# Patient Record
Sex: FEMALE | Race: WHITE | NOT HISPANIC OR LATINO | Employment: FULL TIME | URBAN - METROPOLITAN AREA
[De-identification: names, ages, dates, MRNs, and addresses within clinical notes are randomized per-mention and may not be internally consistent; named-entity substitution may affect disease eponyms.]

---

## 2017-02-01 ENCOUNTER — COMMUNICATION - HEALTHEAST (OUTPATIENT)
Dept: SCHEDULING | Facility: CLINIC | Age: 22
End: 2017-02-01

## 2017-02-03 ENCOUNTER — OFFICE VISIT - HEALTHEAST (OUTPATIENT)
Dept: FAMILY MEDICINE | Facility: CLINIC | Age: 22
End: 2017-02-03

## 2017-02-03 DIAGNOSIS — S13.9XXA NECK SPRAIN: ICD-10-CM

## 2017-02-03 ASSESSMENT — MIFFLIN-ST. JEOR: SCORE: 1154.78

## 2017-02-08 ENCOUNTER — OFFICE VISIT - HEALTHEAST (OUTPATIENT)
Dept: FAMILY MEDICINE | Facility: CLINIC | Age: 22
End: 2017-02-08

## 2017-02-08 DIAGNOSIS — S16.1XXA CERVICAL STRAIN: ICD-10-CM

## 2017-02-08 ASSESSMENT — MIFFLIN-ST. JEOR: SCORE: 1152.51

## 2018-06-06 ENCOUNTER — PRENATAL OFFICE VISIT - HEALTHEAST (OUTPATIENT)
Dept: FAMILY MEDICINE | Facility: CLINIC | Age: 23
End: 2018-06-06

## 2018-06-06 DIAGNOSIS — Z34.90 PREGNANCY: ICD-10-CM

## 2018-06-06 DIAGNOSIS — N91.2 AMENORRHEA: ICD-10-CM

## 2018-06-06 LAB — HCG UR QL: POSITIVE

## 2018-06-06 ASSESSMENT — MIFFLIN-ST. JEOR: SCORE: 1157.15

## 2018-06-11 ENCOUNTER — COMMUNICATION - HEALTHEAST (OUTPATIENT)
Dept: FAMILY MEDICINE | Facility: CLINIC | Age: 23
End: 2018-06-11

## 2018-06-14 ENCOUNTER — COMMUNICATION - HEALTHEAST (OUTPATIENT)
Dept: FAMILY MEDICINE | Facility: CLINIC | Age: 23
End: 2018-06-14

## 2018-06-18 ENCOUNTER — RECORDS - HEALTHEAST (OUTPATIENT)
Dept: ADMINISTRATIVE | Facility: OTHER | Age: 23
End: 2018-06-18

## 2018-06-19 ENCOUNTER — RECORDS - HEALTHEAST (OUTPATIENT)
Dept: ADMINISTRATIVE | Facility: OTHER | Age: 23
End: 2018-06-19

## 2018-06-19 ENCOUNTER — COMMUNICATION - HEALTHEAST (OUTPATIENT)
Dept: FAMILY MEDICINE | Facility: CLINIC | Age: 23
End: 2018-06-19

## 2018-06-21 ENCOUNTER — COMMUNICATION - HEALTHEAST (OUTPATIENT)
Dept: SCHEDULING | Facility: CLINIC | Age: 23
End: 2018-06-21

## 2018-06-25 ENCOUNTER — OFFICE VISIT - HEALTHEAST (OUTPATIENT)
Dept: FAMILY MEDICINE | Facility: CLINIC | Age: 23
End: 2018-06-25

## 2018-06-25 DIAGNOSIS — O03.9 MISCARRIAGE: ICD-10-CM

## 2018-06-25 LAB — HCG SERPL-ACNC: 283 MLU/ML (ref 0–4)

## 2018-06-25 ASSESSMENT — MIFFLIN-ST. JEOR: SCORE: 1135.89

## 2018-06-28 ENCOUNTER — RECORDS - HEALTHEAST (OUTPATIENT)
Dept: ADMINISTRATIVE | Facility: OTHER | Age: 23
End: 2018-06-28

## 2019-02-26 ENCOUNTER — COMMUNICATION - HEALTHEAST (OUTPATIENT)
Dept: FAMILY MEDICINE | Facility: CLINIC | Age: 24
End: 2019-02-26

## 2019-03-09 ENCOUNTER — COMMUNICATION - HEALTHEAST (OUTPATIENT)
Dept: FAMILY MEDICINE | Facility: CLINIC | Age: 24
End: 2019-03-09

## 2019-03-13 ENCOUNTER — OFFICE VISIT - HEALTHEAST (OUTPATIENT)
Dept: FAMILY MEDICINE | Facility: CLINIC | Age: 24
End: 2019-03-13

## 2019-03-13 DIAGNOSIS — N91.2 AMENORRHEA: ICD-10-CM

## 2019-03-13 DIAGNOSIS — Z87.59 HISTORY OF MISCARRIAGE: ICD-10-CM

## 2019-03-13 DIAGNOSIS — Z34.90 PREGNANCY, UNSPECIFIED GESTATIONAL AGE: ICD-10-CM

## 2019-03-13 LAB — HCG UR QL: POSITIVE

## 2019-03-13 ASSESSMENT — MIFFLIN-ST. JEOR: SCORE: 1167.64

## 2019-03-15 ENCOUNTER — COMMUNICATION - HEALTHEAST (OUTPATIENT)
Dept: FAMILY MEDICINE | Facility: CLINIC | Age: 24
End: 2019-03-15

## 2019-04-02 ENCOUNTER — HOSPITAL ENCOUNTER (OUTPATIENT)
Dept: ULTRASOUND IMAGING | Facility: CLINIC | Age: 24
Discharge: HOME | End: 2019-04-02
Attending: FAMILY MEDICINE

## 2019-04-02 DIAGNOSIS — Z34.90 PREGNANCY, UNSPECIFIED GESTATIONAL AGE: ICD-10-CM

## 2019-04-03 ENCOUNTER — COMMUNICATION - HEALTHEAST (OUTPATIENT)
Dept: FAMILY MEDICINE | Facility: CLINIC | Age: 24
End: 2019-04-03

## 2019-04-18 ENCOUNTER — PRENATAL OFFICE VISIT - HEALTHEAST (OUTPATIENT)
Dept: FAMILY MEDICINE | Facility: CLINIC | Age: 24
End: 2019-04-18

## 2019-04-18 DIAGNOSIS — Z34.90 PREGNANCY, UNSPECIFIED GESTATIONAL AGE: ICD-10-CM

## 2019-04-18 LAB
ALBUMIN UR-MCNC: NEGATIVE MG/DL
AMPHETAMINES UR QL SCN: NORMAL
APPEARANCE UR: CLEAR
BARBITURATES UR QL: NORMAL
BASOPHILS # BLD AUTO: 0 THOU/UL (ref 0–0.2)
BASOPHILS NFR BLD AUTO: 1 % (ref 0–2)
BENZODIAZ UR QL: NORMAL
BILIRUB UR QL STRIP: NEGATIVE
CANNABINOIDS UR QL SCN: NORMAL
COCAINE UR QL: NORMAL
COLOR UR AUTO: YELLOW
CREAT UR-MCNC: 71.7 MG/DL
EOSINOPHIL # BLD AUTO: 0.1 THOU/UL (ref 0–0.4)
EOSINOPHIL NFR BLD AUTO: 1 % (ref 0–6)
ERYTHROCYTE [DISTWIDTH] IN BLOOD BY AUTOMATED COUNT: 13.5 % (ref 11–14.5)
GLUCOSE UR STRIP-MCNC: NEGATIVE MG/DL
HCT VFR BLD AUTO: 33.7 % (ref 35–47)
HGB BLD-MCNC: 11.2 G/DL (ref 12–16)
HGB UR QL STRIP: NEGATIVE
HIV 1+2 AB+HIV1 P24 AG SERPL QL IA: NEGATIVE
KETONES UR STRIP-MCNC: NEGATIVE MG/DL
LEUKOCYTE ESTERASE UR QL STRIP: NEGATIVE
LYMPHOCYTES # BLD AUTO: 1.5 THOU/UL (ref 0.8–4.4)
LYMPHOCYTES NFR BLD AUTO: 20 % (ref 20–40)
MCH RBC QN AUTO: 28.4 PG (ref 27–34)
MCHC RBC AUTO-ENTMCNC: 33.2 G/DL (ref 32–36)
MCV RBC AUTO: 85 FL (ref 80–100)
MONOCYTES # BLD AUTO: 0.7 THOU/UL (ref 0–0.9)
MONOCYTES NFR BLD AUTO: 9 % (ref 2–10)
NEUTROPHILS # BLD AUTO: 5.3 THOU/UL (ref 2–7.7)
NEUTROPHILS NFR BLD AUTO: 70 % (ref 50–70)
NITRATE UR QL: NEGATIVE
OPIATES UR QL SCN: NORMAL
OXYCODONE UR QL: NORMAL
PCP UR QL SCN: NORMAL
PH UR STRIP: 7 [PH] (ref 5–8)
PLATELET # BLD AUTO: 263 THOU/UL (ref 140–440)
PMV BLD AUTO: 10.5 FL (ref 8.5–12.5)
RBC # BLD AUTO: 3.95 MILL/UL (ref 3.8–5.4)
SP GR UR STRIP: 1.02 (ref 1–1.03)
UROBILINOGEN UR STRIP-ACNC: NORMAL
WBC: 7.7 THOU/UL (ref 4–11)

## 2019-04-18 ASSESSMENT — MIFFLIN-ST. JEOR: SCORE: 1180.3

## 2019-04-19 LAB
ABO/RH(D): NORMAL
ABORH REPEAT: NORMAL
ANTIBODY SCREEN: NEGATIVE
BACTERIA SPEC CULT: NO GROWTH
C TRACH DNA SPEC QL PROBE+SIG AMP: NEGATIVE
COLLECTION METHOD: NORMAL
HBV SURFACE AG SERPL QL IA: NEGATIVE
LEAD BLD-MCNC: NORMAL UG/DL
LEAD RETEST: NO
N GONORRHOEA DNA SPEC QL NAA+PROBE: NEGATIVE
RUBV IGG SERPL QL IA: POSITIVE
T PALLIDUM AB SER QL: NEGATIVE

## 2019-04-20 LAB — LEAD BLDV-MCNC: <2 UG/DL (ref 0–4.9)

## 2019-04-22 ENCOUNTER — COMMUNICATION - HEALTHEAST (OUTPATIENT)
Dept: FAMILY MEDICINE | Facility: CLINIC | Age: 24
End: 2019-04-22

## 2019-04-22 LAB
BKR LAB AP ABNORMAL BLEEDING: NO
BKR LAB AP BIRTH CONTROL/HORMONES: NORMAL
BKR LAB AP CERVICAL APPEARANCE: NORMAL
BKR LAB AP GYN ADEQUACY: NORMAL
BKR LAB AP GYN INTERPRETATION: NORMAL
BKR LAB AP HPV REFLEX: NORMAL
BKR LAB AP LMP: NORMAL
BKR LAB AP PATIENT STATUS: NORMAL
BKR LAB AP PREVIOUS ABNORMAL: NO
BKR LAB AP PREVIOUS NORMAL: NORMAL
HIGH RISK?: NO
PATH REPORT.COMMENTS IMP SPEC: NORMAL
RESULT FLAG (HE HISTORICAL CONVERSION): NORMAL

## 2019-04-30 ENCOUNTER — COMMUNICATION - HEALTHEAST (OUTPATIENT)
Dept: FAMILY MEDICINE | Facility: CLINIC | Age: 24
End: 2019-04-30

## 2019-05-07 ENCOUNTER — COMMUNICATION - HEALTHEAST (OUTPATIENT)
Dept: FAMILY MEDICINE | Facility: CLINIC | Age: 24
End: 2019-05-07

## 2019-05-08 ENCOUNTER — AMBULATORY - HEALTHEAST (OUTPATIENT)
Dept: FAMILY MEDICINE | Facility: CLINIC | Age: 24
End: 2019-05-08

## 2019-05-08 DIAGNOSIS — Z34.90 PREGNANCY, UNSPECIFIED GESTATIONAL AGE: ICD-10-CM

## 2019-05-08 RX ORDER — PRENATAL VIT/IRON FUM/FOLIC AC 27MG-0.8MG
1 TABLET ORAL DAILY
Qty: 100 TABLET | Refills: 3 | Status: SHIPPED | OUTPATIENT
Start: 2019-05-08

## 2019-05-17 ENCOUNTER — PRENATAL OFFICE VISIT - HEALTHEAST (OUTPATIENT)
Dept: FAMILY MEDICINE | Facility: CLINIC | Age: 24
End: 2019-05-17

## 2019-05-17 DIAGNOSIS — Z34.90 PREGNANCY, UNSPECIFIED GESTATIONAL AGE: ICD-10-CM

## 2019-05-17 ASSESSMENT — MIFFLIN-ST. JEOR: SCORE: 1198.44

## 2019-05-20 ENCOUNTER — COMMUNICATION - HEALTHEAST (OUTPATIENT)
Dept: FAMILY MEDICINE | Facility: CLINIC | Age: 24
End: 2019-05-20

## 2019-05-20 LAB
AFP MOM: 0.86 MOM
ALPHA FETO PROTEIN: 38.5 NG/ML
CALCULATED AGE AT EDD: NORMAL
COLLECTION DATE: NORMAL
CURRENT CIGARETTE SMOKING STATUS: NORMAL
DOWN SYNDROME MATERNAL AGE RISK: NORMAL
DOWN SYNDROME SCREEN RISK ESTIMATE: NORMAL
EDD BY U/S SCAN: NORMAL
GA ON COLLECTION BY U/S SCAN: NORMAL WK,D
GA USED IN RISK ESTIMATE: NORMAL
GENERAL TEST INFORMATION: NORMAL
HCG, TOTAL MOM: 2.22 MOM
HCG, TOTAL: 65.3 IU/ML
INHIBIN MOM: 1.32 MOM
INHIBIN: 236 PG/ML
INITIAL OR REPEAT TESTING: NORMAL
INSULIN DIABETIC: NO
INTERPRETATION: NORMAL
IVF PREGNANCY: NO
Lab: NORMAL
NEURAL TUBE DEFECT RISK ESTIMATE: NORMAL
NUMBER OF CHORIONS: NORMAL
NUMBER OF FETUSES:: 1
PATIENT OR FATHER OF BABY HAS A NTD: NO
PATIENT WEIGHT: 114 LBS
PHYSICIAN PHONE NUMBER: NORMAL
PREV DOWN (T21) / TRISOMY PREGNANCY: NO
PREV PREGNANCY W/ NEURAL TUBE DEFECT: NO
PT DATE OF BIRTH: NORMAL
RACE OF MOTHER: NORMAL
RECOMMENDED FOLLOW UP: NORMAL
TRISOMY 18 SCREEN RISK ESTIMATE: NORMAL
UE3 MOM: 0.88 MOM
UE3: 1.06 NG/ML

## 2019-05-24 ENCOUNTER — COMMUNICATION - HEALTHEAST (OUTPATIENT)
Dept: FAMILY MEDICINE | Facility: CLINIC | Age: 24
End: 2019-05-24

## 2019-06-03 ENCOUNTER — HOSPITAL ENCOUNTER (OUTPATIENT)
Dept: ULTRASOUND IMAGING | Facility: CLINIC | Age: 24
Discharge: HOME | End: 2019-06-03
Attending: FAMILY MEDICINE

## 2019-06-03 DIAGNOSIS — Z34.90 PREGNANCY, UNSPECIFIED GESTATIONAL AGE: ICD-10-CM

## 2019-06-07 ENCOUNTER — COMMUNICATION - HEALTHEAST (OUTPATIENT)
Dept: FAMILY MEDICINE | Facility: CLINIC | Age: 24
End: 2019-06-07

## 2019-06-20 ENCOUNTER — PRENATAL OFFICE VISIT - HEALTHEAST (OUTPATIENT)
Dept: FAMILY MEDICINE | Facility: CLINIC | Age: 24
End: 2019-06-20

## 2019-06-20 DIAGNOSIS — Z34.90 PREGNANCY, UNSPECIFIED GESTATIONAL AGE: ICD-10-CM

## 2019-06-20 ASSESSMENT — MIFFLIN-ST. JEOR: SCORE: 1248.34

## 2019-07-19 ENCOUNTER — PRENATAL OFFICE VISIT - HEALTHEAST (OUTPATIENT)
Dept: FAMILY MEDICINE | Facility: CLINIC | Age: 24
End: 2019-07-19

## 2019-07-19 ENCOUNTER — AMBULATORY - HEALTHEAST (OUTPATIENT)
Dept: FAMILY MEDICINE | Facility: CLINIC | Age: 24
End: 2019-07-19

## 2019-07-19 ENCOUNTER — COMMUNICATION - HEALTHEAST (OUTPATIENT)
Dept: FAMILY MEDICINE | Facility: CLINIC | Age: 24
End: 2019-07-19

## 2019-07-19 DIAGNOSIS — Z34.90 PREGNANCY, UNSPECIFIED GESTATIONAL AGE: ICD-10-CM

## 2019-07-19 DIAGNOSIS — O99.019 ANTEPARTUM ANEMIA: ICD-10-CM

## 2019-07-19 LAB
FASTING STATUS PATIENT QL REPORTED: NO
GLUCOSE 1H P 50 G GLC PO SERPL-MCNC: 100 MG/DL (ref 70–139)
HGB BLD-MCNC: 10 G/DL (ref 12–16)

## 2019-07-19 ASSESSMENT — MIFFLIN-ST. JEOR: SCORE: 1284.62

## 2019-07-20 LAB — T PALLIDUM AB SER QL: NEGATIVE

## 2019-08-02 ENCOUNTER — PRENATAL OFFICE VISIT - HEALTHEAST (OUTPATIENT)
Dept: FAMILY MEDICINE | Facility: CLINIC | Age: 24
End: 2019-08-02

## 2019-08-02 DIAGNOSIS — Z34.90 PREGNANCY, UNSPECIFIED GESTATIONAL AGE: ICD-10-CM

## 2019-08-02 ASSESSMENT — MIFFLIN-ST. JEOR: SCORE: 1307.3

## 2019-08-13 ENCOUNTER — HOSPITAL ENCOUNTER (OUTPATIENT)
Dept: OBGYN | Facility: HOSPITAL | Age: 24
Discharge: HOME | End: 2019-08-13
Attending: FAMILY MEDICINE | Admitting: FAMILY MEDICINE

## 2019-08-13 ASSESSMENT — MIFFLIN-ST. JEOR: SCORE: 1307.3

## 2019-08-16 ENCOUNTER — PRENATAL OFFICE VISIT - HEALTHEAST (OUTPATIENT)
Dept: FAMILY MEDICINE | Facility: CLINIC | Age: 24
End: 2019-08-16

## 2019-08-16 DIAGNOSIS — Z34.90 PREGNANCY, UNSPECIFIED GESTATIONAL AGE: ICD-10-CM

## 2019-08-16 ASSESSMENT — MIFFLIN-ST. JEOR: SCORE: 1320.91

## 2019-08-20 ENCOUNTER — COMMUNICATION - HEALTHEAST (OUTPATIENT)
Dept: FAMILY MEDICINE | Facility: CLINIC | Age: 24
End: 2019-08-20

## 2019-08-22 ENCOUNTER — COMMUNICATION - HEALTHEAST (OUTPATIENT)
Dept: OBGYN | Facility: HOSPITAL | Age: 24
End: 2019-08-22

## 2019-08-30 ENCOUNTER — PRENATAL OFFICE VISIT - HEALTHEAST (OUTPATIENT)
Dept: FAMILY MEDICINE | Facility: CLINIC | Age: 24
End: 2019-08-30

## 2019-08-30 ENCOUNTER — COMMUNICATION - HEALTHEAST (OUTPATIENT)
Dept: FAMILY MEDICINE | Facility: CLINIC | Age: 24
End: 2019-08-30

## 2019-08-30 DIAGNOSIS — Z34.90 PREGNANCY, UNSPECIFIED GESTATIONAL AGE: ICD-10-CM

## 2019-08-30 ASSESSMENT — MIFFLIN-ST. JEOR: SCORE: 1339.06

## 2019-09-04 ENCOUNTER — COMMUNICATION - HEALTHEAST (OUTPATIENT)
Dept: FAMILY MEDICINE | Facility: CLINIC | Age: 24
End: 2019-09-04

## 2019-09-11 ENCOUNTER — PRENATAL OFFICE VISIT - HEALTHEAST (OUTPATIENT)
Dept: FAMILY MEDICINE | Facility: CLINIC | Age: 24
End: 2019-09-11

## 2019-09-11 DIAGNOSIS — O26.03 EXCESSIVE WEIGHT GAIN DURING PREGNANCY IN THIRD TRIMESTER: ICD-10-CM

## 2019-09-11 DIAGNOSIS — Z34.90 PREGNANCY, UNSPECIFIED GESTATIONAL AGE: ICD-10-CM

## 2019-09-11 LAB — HGB BLD-MCNC: 11.7 G/DL (ref 12–16)

## 2019-09-11 ASSESSMENT — MIFFLIN-ST. JEOR: SCORE: 1366.27

## 2019-09-18 ENCOUNTER — PRENATAL OFFICE VISIT - HEALTHEAST (OUTPATIENT)
Dept: FAMILY MEDICINE | Facility: CLINIC | Age: 24
End: 2019-09-18

## 2019-09-18 DIAGNOSIS — Z34.90 PREGNANCY, UNSPECIFIED GESTATIONAL AGE: ICD-10-CM

## 2019-09-18 DIAGNOSIS — O26.03 EXCESSIVE WEIGHT GAIN DURING PREGNANCY IN THIRD TRIMESTER: ICD-10-CM

## 2019-09-18 DIAGNOSIS — O26.843 UTERINE SIZE-DATE DISCREPANCY IN THIRD TRIMESTER: ICD-10-CM

## 2019-09-18 ASSESSMENT — MIFFLIN-ST. JEOR: SCORE: 1375.34

## 2019-09-19 ENCOUNTER — COMMUNICATION - HEALTHEAST (OUTPATIENT)
Dept: FAMILY MEDICINE | Facility: CLINIC | Age: 24
End: 2019-09-19

## 2019-09-23 ENCOUNTER — HOSPITAL ENCOUNTER (OUTPATIENT)
Dept: ULTRASOUND IMAGING | Facility: CLINIC | Age: 24
Discharge: HOME | End: 2019-09-23
Attending: FAMILY MEDICINE

## 2019-09-23 DIAGNOSIS — O26.843 UTERINE SIZE-DATE DISCREPANCY IN THIRD TRIMESTER: ICD-10-CM

## 2019-09-23 DIAGNOSIS — Z34.90 PREGNANCY, UNSPECIFIED GESTATIONAL AGE: ICD-10-CM

## 2019-09-25 ENCOUNTER — PRENATAL OFFICE VISIT - HEALTHEAST (OUTPATIENT)
Dept: FAMILY MEDICINE | Facility: CLINIC | Age: 24
End: 2019-09-25

## 2019-09-25 DIAGNOSIS — O26.03 EXCESSIVE WEIGHT GAIN DURING PREGNANCY IN THIRD TRIMESTER: ICD-10-CM

## 2019-09-25 DIAGNOSIS — Z34.90 PREGNANCY, UNSPECIFIED GESTATIONAL AGE: ICD-10-CM

## 2019-09-25 DIAGNOSIS — Z34.83 ENCOUNTER FOR SUPERVISION OF OTHER NORMAL PREGNANCY IN THIRD TRIMESTER: ICD-10-CM

## 2019-09-25 ASSESSMENT — MIFFLIN-ST. JEOR: SCORE: 1384.42

## 2019-09-27 LAB
ALLERGIC TO PENICILLIN: NO
GP B STREP DNA SPEC QL NAA+PROBE: NEGATIVE

## 2019-10-03 ENCOUNTER — RECORDS - HEALTHEAST (OUTPATIENT)
Dept: ADMINISTRATIVE | Facility: OTHER | Age: 24
End: 2019-10-03

## 2019-10-04 ENCOUNTER — COMMUNICATION - HEALTHEAST (OUTPATIENT)
Dept: FAMILY MEDICINE | Facility: CLINIC | Age: 24
End: 2019-10-04

## 2019-10-07 ENCOUNTER — COMMUNICATION - HEALTHEAST (OUTPATIENT)
Dept: FAMILY MEDICINE | Facility: CLINIC | Age: 24
End: 2019-10-07

## 2019-10-07 ASSESSMENT — EDINBURGH POSTNATAL DEPRESSION SCALE (EPDS): TOTAL SCORE: 11

## 2019-10-09 ENCOUNTER — COMMUNICATION - HEALTHEAST (OUTPATIENT)
Dept: FAMILY MEDICINE | Facility: CLINIC | Age: 24
End: 2019-10-09

## 2019-10-10 ENCOUNTER — COMMUNICATION - HEALTHEAST (OUTPATIENT)
Dept: FAMILY MEDICINE | Facility: CLINIC | Age: 24
End: 2019-10-10

## 2019-10-22 ENCOUNTER — COMMUNICATION - HEALTHEAST (OUTPATIENT)
Dept: FAMILY MEDICINE | Facility: CLINIC | Age: 24
End: 2019-10-22

## 2019-11-04 ENCOUNTER — COMMUNICATION - HEALTHEAST (OUTPATIENT)
Dept: HEALTH INFORMATION MANAGEMENT | Facility: CLINIC | Age: 24
End: 2019-11-04

## 2019-11-06 ENCOUNTER — AMBULATORY - HEALTHEAST (OUTPATIENT)
Dept: NURSING | Facility: CLINIC | Age: 24
End: 2019-11-06

## 2019-11-11 ENCOUNTER — COMMUNICATION - HEALTHEAST (OUTPATIENT)
Dept: FAMILY MEDICINE | Facility: CLINIC | Age: 24
End: 2019-11-11

## 2019-11-20 ENCOUNTER — OFFICE VISIT - HEALTHEAST (OUTPATIENT)
Dept: FAMILY MEDICINE | Facility: CLINIC | Age: 24
End: 2019-11-20

## 2019-11-20 DIAGNOSIS — Z30.09 GENERAL COUNSELING FOR PRESCRIPTION OF ORAL CONTRACEPTIVES: ICD-10-CM

## 2019-11-20 RX ORDER — ACETAMINOPHEN AND CODEINE PHOSPHATE 120; 12 MG/5ML; MG/5ML
1 SOLUTION ORAL DAILY
Qty: 28 TABLET | Refills: 11 | Status: SHIPPED | OUTPATIENT
Start: 2019-11-20 | End: 2023-01-25

## 2019-11-20 ASSESSMENT — MIFFLIN-ST. JEOR: SCORE: 1271.02

## 2019-12-16 ENCOUNTER — COMMUNICATION - HEALTHEAST (OUTPATIENT)
Dept: FAMILY MEDICINE | Facility: CLINIC | Age: 24
End: 2019-12-16

## 2020-08-11 ENCOUNTER — COMMUNICATION - HEALTHEAST (OUTPATIENT)
Dept: FAMILY MEDICINE | Facility: CLINIC | Age: 25
End: 2020-08-11

## 2020-08-13 ENCOUNTER — OFFICE VISIT - HEALTHEAST (OUTPATIENT)
Dept: FAMILY MEDICINE | Facility: CLINIC | Age: 25
End: 2020-08-13

## 2020-08-13 ENCOUNTER — COMMUNICATION - HEALTHEAST (OUTPATIENT)
Dept: FAMILY MEDICINE | Facility: CLINIC | Age: 25
End: 2020-08-13

## 2020-08-13 ENCOUNTER — COMMUNICATION - HEALTHEAST (OUTPATIENT)
Dept: SCHEDULING | Facility: CLINIC | Age: 25
End: 2020-08-13

## 2020-08-13 DIAGNOSIS — G43.019 INTRACTABLE MIGRAINE WITHOUT AURA AND WITHOUT STATUS MIGRAINOSUS: ICD-10-CM

## 2020-08-13 RX ORDER — SUMATRIPTAN 50 MG/1
TABLET, FILM COATED ORAL
Qty: 30 TABLET | Refills: 2 | Status: SHIPPED | OUTPATIENT
Start: 2020-08-13 | End: 2021-11-11

## 2020-08-14 ENCOUNTER — COMMUNICATION - HEALTHEAST (OUTPATIENT)
Dept: FAMILY MEDICINE | Facility: CLINIC | Age: 25
End: 2020-08-14

## 2020-08-19 ENCOUNTER — COMMUNICATION - HEALTHEAST (OUTPATIENT)
Dept: FAMILY MEDICINE | Facility: CLINIC | Age: 25
End: 2020-08-19

## 2020-08-19 DIAGNOSIS — J02.9 SORE THROAT: ICD-10-CM

## 2021-05-27 NOTE — PATIENT INSTRUCTIONS - HE
Patient Education   HEALTHY PREGNANCY CARE: 10-14 WEEKS PREGNANT     By weeks 10 to 14 of your pregnancy, the placenta has formed inside your uterus. It may be possible to hear your baby's heartbeat with a doppler ultrasound device. Your baby's eyes can and do move. The arms and legs can bend.    The second trimester genetic screening tests for Down's Syndrome, Trisomy 18, and neural tube defects (which are known collectively as a quad screen) are done at 15 to 22 weeks. It's your choice whether to have these tests. You and your partner can talk to your midwife or physician about birth defects tests.    Consider breastfeeding for the healthiest way to feed your baby. Ask your midwife or physician for more information.     As your center of gravity and weight changes, use good body mechanics when changing positions and lifting. For example, use a straight back and your legs for support when lifting instead of bending over. Maintain good posture to prevent straining your muscles. Now is a good time to continue or restart your exercise program. Walking 30-60 minutes daily is an excellent way to keep fit. Yoga and swimming also offer many benefits.    The nausea and fatigue of early pregnancy have usually started to let up, so this is a good time to focus on nutrition. Consider attending a nutrition class. A healthy diet includes about 60 grams of protein each day (3-4 servings of dairy, 2-3 servings of meat/fish/poultry/nuts), 4-6 servings of whole grain foods, and 5-6 servings of fruits and vegetables. Remember to drink 6-8 glasses of water daily.    Watch for warning signs, such as     vaginal bleeding    fluid leaking from your vagina    severe abdominal pain    nausea and vomiting more than 4-5 times a day, or if you are unable to keep anything down    fever more than 100.4 degrees F.     Contact your midwife or physician at [facility] MEDICINE/OB  at Phone: [phone] if you have these or any other  concerns. If it's after clinic hours, physician patients should call the Care Connection at [phone]); midwife patients should call their answering service at [phone].    How can you care for yourself at home?   You can refer to the Starting Out Right book or find it online at [email or web address] or [email or web address]  You can sign up for a weekly parenting e-mail that gives support, tips and advice from health care professionals that starts with pregnancy and continues through the toddler years. To register, go to [email or web address] at any time during your pregnancy.      Breastfeeding: a Healthy Option for You and Your Baby    The choice of how you will feed your baby is important.  Before your baby s birth, you ll want to learn about the benefits of breastfeeding.  [facility] have been designated Baby Friendly; an initiative that was created by the World Health Organization and UNICEF.  This helps give you and your baby the best start in feeding their baby.    Why should I breastfeed my baby?    Babies are less likely to develop childhood obesity or diabetes    Babies are less likely to suffer from recurrent ear infections    Babies are less likely to be hospitalized for respiratory conditions    Breast milk is rich in nutrients and antibodies-it is easy to digest    How does it benefit me?    Lowers the risk for diabetes, breast and ovarian cancer and postpartum depression    Moms can lose  baby weight  more quickly    Cost savings - formula can cost well over $1,500 per year    Convenient - no bottles and nipples to sterilize, no measuring and mixing formula    The physical contact with breastfeeding can make babies feel secure, warm and comforted     What about formula?  While you and your baby are staying with us at  [facility], we will support whatever feeding choice you make for your baby.    Some important considerations:      The American Academy of Pediatrics, the World Health Organization, and many more organizations recommend exclusive breastfeeding for 6 months and continued breastfeeding while adding other foods for the first 1-2 years.      Any amount of breastmilk has benefits to both baby and mother.    Giving formula in replacement of breastfeeding can affect mother s milk supply.  If formula is needed, hospital staff will work with you on a plan to help develop your milk supply.    Formula alters the natural growth of good bacteria in the  stomach.     Research has found that first time mothers who offer formula in the hospital have a shorter duration of breastfeeding.    How can I start to prepare?     Start by having a conversation with your medical provider.     Talk with your partner, family and friends.     Attend a prenatal class that includes breastfeeding preparation. Birth and breastfeeding classes are offered by [employer]. Visit [employer] for class information.     After your baby s birth, hospital staff and lactation consultants will help you and your baby get off to a great start with breastfeeding.    Resources:      [facility] clinic and hospital staff will support you throughout your pregnancy, delivery and beyond.  Visit [email or web address] for more details.        A free weekly pregnancy and parenting email is offered by [facility]. Emails include week by week information about your pregnancy, baby s development, breastfeeding and beyond.  Sign up at [email or web address].    [facility] [phone].  Consultants are available for assessment of your breastfeeding concerns, support and education.    [employer] helps mothers worldwide to breastfeed through mother-to-mother support, encouragement, information and  education. [facility] promotes breastfeeding as an important element in the healthy development of baby and mother. Monthly classes, support groups and telephone help are offered in your community. To learn more visit [email or web address].    [facility] Care Connection: [phone] ([phone])

## 2021-05-28 NOTE — PATIENT INSTRUCTIONS - HE
"Patient Education   HEALTHY PREGNANCY CARE: 14 to 18 WEEKS PREGNANT    During this time, you may start to \"show,\" so that you look pregnant to people around you. You may also notice some changes in your skin, such as an increase in acne on your face. You may notice your heart pounding, a sharp stretching ache on either side of your lower abdomen (round ligament), and more vaginal discharge.     Your baby's nerves and muscles are maturing. Sex organs are recognizable. Your baby is now able to pass urine, and your baby's first stool (meconium) is starting to collect in his or her intestines. Hair is also beginning to grow on your baby's head. Your baby is moving freely inside your uterus but you may not be able to feel it until 18-20 weeks.    Continue making healthy choices for your baby during pregnancy, including good nutrition, exercise and a safe environment free from smoking, alcohol and drugs.    Your genetic screening with a quad screen blood test may have been done today.    Watch for warning signs and contact your midwife or physician at the clinic with any concerns at [facility] MEDICINE/OB  at Phone: [phone]. For example, call about cramping, bleeding, abdominal pain, watery vaginal discharge, or if you are unable to keep fluids down for more than 24 hours due to vomiting.   If it's after clinic hours, physician patients should call the Care Connection at [phone]); midwife patients should call their answering service at [phone].    How can you care for yourself at home?   You can refer to the Starting Out Right book or find it online at [email or web address] or [email or web address]/images/[email or web address]/[email or web address]-starting-out-right/[email or web address]-starting-out-[email or web address]#p=[email or web address]     You can sign up for a weekly parenting e-mail that gives support, tips and advice from health care professionals that starts with " pregnancy and continues through the toddler years. To register, go to [email or web address] at any time during your pregnancy.         To schedule at [facility] or [facility]:  [phone]

## 2021-05-28 NOTE — PROGRESS NOTES
New OB Clinic Note - 2019     SUBJECTIVE:  Claritza Obando is a 23 y.o.  female @ 12w6d who is here for new OB visit.   LMP exact giving YOSI 10/25 which is consistent (within 2 days) of 10 week US giving YOSI 10/27. Will go with exact LMP giving YOSI 10/25.    Current Concerns: Had slight left sided abdominal pain a few days ago and feels like she has gas  She has some nausea but has not been taking the vitamin B6 and unisom- she is managing without medications and doing well with small portions  She denies bleeding, cramping. No loss of fluid. She denies HA.   Denies dysuria or hematuria.     OB History:  Patient is . Prior Pregnancies:  OB History    Para Term  AB Living   2       1     SAB TAB Ectopic Multiple Live Births   1              # Outcome Date GA Lbr Chucky/2nd Weight Sex Delivery Anes PTL Lv   2 Current            1 SAB 2018 8w0d            She does not have a history of  birth before 37 weeks with a browning gestation.  She does not have a history of preeclampsia in prior pregnancy.   She does not have a history of recurrent (>2) pregnancy losses.  She does not have a history of Down's syndrome, sickle cell anemia or other genetic disorder affecting a child in her family.  She does not have a history of major depression or postpartum depression.  She does not have a history of STI's including Chlamydia, gonorrhea, Hep B virus, syphilis, HPV, or genital herpes.   Social Hx:   She has support from her family and father of baby, boyfriend, Jitendra Portillo, is involved.   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  She has not used tobacco, has not used EtOH and has not used illicit drugs.  Current Medications: prenatal vitamins  Past Medical History:   Diagnosis Date     Constipation      Miscarriage 2018     Urinary tract infection      Varicella     had chicken pox     Past Surgical History:   Procedure Laterality Date     PA DENTAL SURGERY PROCEDURE      Description: Oral Surgery  "Tooth Extraction;  Recorded: 06/13/2012;  Comments: wisdom teeth removed 4/21/11     Family History   Problem Relation Age of Onset     Ovarian cysts Mother      Asthma Father      Diabetes Father      Drug abuse Father      Stroke Maternal Grandfather      Heart disease Maternal Grandfather      Cancer Paternal Grandmother      Diabetes Paternal Grandmother      Stroke Paternal Grandfather      Asthma Paternal Grandfather      Current Outpatient Medications on File Prior to Visit   Medication Sig Dispense Refill     prenatal no115-iron-folic acid 29 mg iron- 1 mg Chew Chew daily.       doxylamine (UNISOM) 25 mg tablet Take 1 tablet (25 mg total) by mouth at bedtime as needed for sleep or nausea (Nausea). 90 tablet 1     pyridoxine, vitamin B6, (VITAMIN B-6) 25 MG tablet Take 1 tablet (25 mg total) by mouth 3 (three) times a day. 90 tablet 3     No current facility-administered medications on file prior to visit.      ?  OBJECTIVE:  Vitals:    04/18/19 1412   BP: 100/56   Patient Site: Left Arm   Patient Position: Sitting   Cuff Size: Adult Regular   Pulse: 74   Resp: 16   Temp: 98.3  F (36.8  C)   TempSrc: Oral   SpO2: 100%   Weight: 110 lb (49.9 kg)   Height: 5' 1.25\" (1.556 m)     Gen: Awake, alert, in no acute distress  HEENT: oral mucosa is moist and pink, dentition is adequate   Neck: Thyroid is non-enlarged, without nodules or tenderness  CV: RRR with normal S1 and S2. No murmurs appreciated.  Resp: Lungs are clear to auscultation bilaterally without crackles or wheezing.  Abd: non-tender, non-distended. Bowel sounds present.   Pelvic Exam: Vagina and vulva are normal; no discharge is noted. Cervix normal without lesions. Uterus anteverted and mobile, normal in size and shape without tenderness. Adnexa normal in size without masses or tenderness. She is due for a pap smear today.  Lower extremities: No edema  Neuro: No focal deficits  's  Results for orders placed or performed in visit on 04/18/19 "   Urinalysis Macroscopic   Result Value Ref Range    Color, UA Yellow Colorless, Yellow, Straw, Light Yellow    Clarity, UA Clear Clear    Glucose, UA Negative Negative    Bilirubin, UA Negative Negative    Ketones, UA Negative Negative    Specific Gravity, UA 1.020 1.005 - 1.030    Blood, UA Negative Negative    pH, UA 7.0 5.0 - 8.0    Protein, UA Negative Negative mg/dL    Urobilinogen, UA 0.2 E.U./dL 0.2 E.U./dL, 1.0 E.U./dL    Nitrite, UA Negative Negative    Leukocytes, UA Negative Negative   HM1 (CBC with Diff)   Result Value Ref Range    WBC 7.7 4.0 - 11.0 thou/uL    RBC 3.95 3.80 - 5.40 mill/uL    Hemoglobin 11.2 (L) 12.0 - 16.0 g/dL    Hematocrit 33.7 (L) 35.0 - 47.0 %    MCV 85 80 - 100 fL    MCH 28.4 27.0 - 34.0 pg    MCHC 33.2 32.0 - 36.0 g/dL    RDW 13.5 11.0 - 14.5 %    Platelets 263 140 - 440 thou/uL    MPV 10.5 8.5 - 12.5 fL    Neutrophils % 70 50 - 70 %    Lymphocytes % 20 20 - 40 %    Monocytes % 9 2 - 10 %    Eosinophils % 1 0 - 6 %    Basophils % 1 0 - 2 %    Neutrophils Absolute 5.3 2.0 - 7.7 thou/uL    Lymphocytes Absolute 1.5 0.8 - 4.4 thou/uL    Monocytes Absolute 0.7 0.0 - 0.9 thou/uL    Eosinophils Absolute 0.1 0.0 - 0.4 thou/uL    Basophils Absolute 0.0 0.0 - 0.2 thou/uL     ASSESSMENT/PLAN:  Claritza Obando is a 23 y.o.  at 12w6d weeks by exact LMP c/w 10 wk US. Estimated Date of Delivery: 10/25/19.   1. Discussed recommended weight gain, healthy eating habits, exercise, common first trimester concerns (nausea, vomiting, constipation, fatigue, GERD, urinary frequency) and warning signs for miscarriage and  labor (bleeding, cramping).   2. Started prenatal vitamins.   3. Pelvic exam including GC, Chlamydia and PAP (if >21yrs) was completed.  4. Prenatal labs completed - prenatal profile, UA/UC, HIV   5. Reviewed eligibility for low-dose aspirin therapy for prevention of preeclampsia (preeclampsia in prior pregnancy, pre-existing HTN or DM, or multiple other risk factors  including  delivery, chronic HTN, DM, obesity, low socioeconomic status, non- ethnicity). Patient is not a candidate for this.   6. Reviewed eligibility for 17-hydroxyprogesterone therapy for prevention of  birth (prior browning delivery before 37 weeks). Patient is not a candidate for this.   7. Offered first trimester screening for aneuploidy (trisomy 21, 13, 18) with laboratory and nuchal translucency. Patient declined this. She does want quad screen at next visit  8. Counseled on benefits of breastfeeding. Patient is planning to breastfeed.   9. Discussed seatbelts, diet, exercise, caffeine intake, daily vitamins, child birth classes, choosing a baby doctor, and regular prenatal exams   Claritza was seen today for initial prenatal visit.    Diagnoses and all orders for this visit:    Pregnancy, unspecified gestational age  -     ABO/RH Typing (OP order)  -     Hepatitis B Surface antigen (HBsAG)  -     HIV Antigen/Antibody Screening Cascade  -     HM1(CBC and Differential)  -     HML Antibody Screen  -     RPR  -     Rubella Immune Status (IgG)  -     Urinalysis Macroscopic  -     Culture, Urine  -     Chlamydia/gonorrhoeae CERVIX  -     Lead, Blood  -     Pap Smear  -     Drugs of Abuse 1,Urine  -     HM1 (CBC with Diff)      ?  RTC in 4 weeks or sooner if problems. At next visit: quad screen    Brenda Shaikh MD    Options for treatment and follow-up care were reviewed with the patient and/or guardian. Claritza ZABALA Miryammichaelle and/or guardian engaged in the decision making process and verbalized understanding of the options discussed and agreed with the final plan.

## 2021-05-28 NOTE — PROGRESS NOTES
"SUBJECTIVE:  Claritza Obando is a 23 y.o. female  at 17w0d by exact LMP c/w 10 wk US. Estimated Date of Delivery: 10/25/19.  She is doing well. She denies nausea and vomiting. She denies abdominal pain/cramping, vaginal bleeding, leakage of fluid. Fetal movement is absent.   Concerns: She is having some sharp lower abdominal pains that last a few seconds. She is going on trip to [place] in a few weeks as well as [place] this weekend.  ROS  Negative except as noted above in HPI.  OBJECTIVE:  Blood pressure 120/48, pulse 88, temperature 98  F (36.7  C), temperature source Oral, resp. rate 20, height 5' 1.25\" (1.556 m), weight 114 lb (51.7 kg), last menstrual period 2019, unknown if currently breastfeeding.  Gen: comfortable, no acute distress.  See OB Vitals flowsheet.  ASSESSMENT/PLAN:  Claritza was seen today for routine prenatal visit.    Diagnoses and all orders for this visit:    Pregnancy, unspecified gestational age  -     US OB > = 14 Weeks; Future  -     Quad Screen (Second Trimester) Maternal      -IUP at 17w0d:   Prenatal labs reviewed   Quad screen done today  Fetal survey scheduled today  Peripartum Anesthesia: the patient does desire an epidural during labor.   Post-partum Contraception: unsure   Breast/Bottle: breast exclusively   RTC in 4 weeks or sooner with problems.        Brenda Shaikh MD    "

## 2021-05-29 NOTE — PROGRESS NOTES
"SUBJECTIVE:  Claritza Obando is a 23 y.o. female  at 21w6d by exact LMP c/w 10 wk US. Estimated Date of Delivery: 10/25/19.  She is doing well. She denies nausea and vomiting. She denies abdominal pain/cramping, vaginal bleeding, leakage of fluid. Fetal movement is present.   Concerns: had ultrasound and dad found out gender and told family members but mom is waiting until gender reveal party. She is having some leg cramping intermittently- denies any swelling or redness. No shortness of breath or chest pain.   ROS  Negative except as noted above in HPI.  OBJECTIVE:  Blood pressure 122/50, pulse 86, temperature 98.3  F (36.8  C), temperature source Oral, resp. rate 16, height 5' 1.25\" (1.556 m), weight 125 lb (56.7 kg), last menstrual period 2019, SpO2 98 %, unknown if currently breastfeeding.  Gen: comfortable, no acute distress.  See OB Vitals flowsheet.  ASSESSMENT/PLAN:  Claritza was seen today for routine prenatal visit.    Diagnoses and all orders for this visit:    Pregnancy, unspecified gestational age      -IUP at 21w6d:   Prenatal labs reviewed and normal  Quad screen done, results came back normal.   Fetal survey was done and normal. EFW 52%ile. Breech- will need to check position around 28-32 weeks  GTT at next appointment  Peripartum Anesthesia: the patient DOES desire an epidural during labor.   Post-partum Contraception: UNSURE  Breast/Bottle: breastfeed   RTC in 4 weeks or sooner with problems. GTT, syphilis, hgb at next visit      Brenda Shaikh MD    "

## 2021-05-30 VITALS — HEIGHT: 62 IN | WEIGHT: 103 LBS | BODY MASS INDEX: 18.95 KG/M2

## 2021-05-30 VITALS — BODY MASS INDEX: 19.05 KG/M2 | WEIGHT: 103.5 LBS | HEIGHT: 62 IN

## 2021-05-30 NOTE — PROGRESS NOTES
"SUBJECTIVE:  Claritza Obando is a 23 y.o. female  at 26w0d by exact LMP c/w 10 wk US. Estimated Date of Delivery: 10/25/19.  She is doing well. She denies nausea and vomiting. She denies abdominal pain/cramping, vaginal bleeding, leakage of fluid. Fetal movement is present.    Concerns: Feeling more pelvic pressure at times- especially when standing for long periods of time. She is standing at work- she does have belly band.   ROS  Negative except as noted above in HPI.  OBJECTIVE:  Blood pressure 120/62, pulse 88, temperature 97.4  F (36.3  C), temperature source Oral, resp. rate 16, height 5' 1.25\" (1.556 m), weight 133 lb (60.3 kg), last menstrual period 2019, unknown if currently breastfeeding.  Gen: comfortable, no acute distress.  Abd: soft, gravid, non-tender uterus  See OB Vitals flowsheet.  ASSESSMENT/PLAN:  Claritza was seen today for routine prenatal visit.    Diagnoses and all orders for this visit:    Pregnancy, unspecified gestational age  -     Glucose,Gestational Challenge (1 Hour)  -     Hemoglobin  -     Syphilis Screen, [place]      -IUP at 26w0d:   Prenatal labs reviewed   Quad screen done, results came back normal.   Fetal survey was done and normal. Breech- will recheck with bedside US at next visit  GTT done today  Peripartum Anesthesia: the patient does desire an epidural during labor.   Post-partum Contraception: unsure   Breast/Bottle: breastfeed- asking about pump today- reviewed that she should check with insurance.   RTC in 2 weeks or sooner with problems.      Brenda Shaikh MD    "

## 2021-05-30 NOTE — PATIENT INSTRUCTIONS - HE
Patient Education   HEALTHY PREGNANCY CARE: 26-30 WEEKS PREGNANT    You are now in your last trimester of pregnancy. Your baby is growing rapidly, can open and close her eyelids, sometimes get hiccups, and you'll probably feel her moving around more often. Your baby has breathing movements and is gaining about one ounce each day. You may notice heartburn and leg cramps. Your back may ache as your body gets used to your baby's size and length.    Discuss your work situation with your midwife or physician as needed. If you stand for long periods of time, you may need to make changes and take breaks.    Pre-register online at the hospital where your baby will be born ([email or web address])     Be aware of your baby's activity level. You may be asked to do daily fetal movement counts. Contact your midwife or physician about any decreased movement.    You may have been tested for gestational diabetes today. If you are RH negative, you may have had an additional test and a Rhogam injection.    Consider receiving a Tdap vaccination to protect your baby from Pertussis/whooping cough.    If you are considering tubal ligation, discuss this with your midwife or physician. You will need to have an appointment with the obstetrician who will do the surgery to discuss the risks, benefits, and alternatives, and to sign the consent. This can be discussed at your next visit.    Continue to watch for any signs or symptoms of premature labor:     Regular contractions. This means having about 6 or more within 1 hour, even after you have had a glass of water and are resting.     A backache that starts and stops regularly.    An increase or change in vaginal discharge, such as heavy, mucus-like, watery, or bloody discharge.     Your water breaks or leaks.    Continue to watch for signs and symptoms of preeclampsia:     Sudden swelling of your face, hands, or feet     New vision problems  such as blurring, double vision, or flashing lights    A severe headache not relieved with acetaminophen (Tylenol)    Sharp or stabbing pain in your right or middle upper abdomen    If you have any of the above symptoms or any other concerns, call your midwife or physician or their clinic staff at [facility] MEDICINE/OB  at Phone: [phone]. If it's after clinic hours, physician patients should call the Care Connection at [phone] ([phone]); midwife patients should call their answering service at [phone].    How can you care for yourself at home?   You can refer to the Starting Out Right book or find it online at [email or web address]/images/[email or web address]/[email or web address]-Right.pdf or http://www.[email or web address]/images/stories/flipbooks/[facility]-starting-out-right/[facility]-starting-out-right.html#p=8     You can sign up for a weekly parenting e-mail that gives support, tips and advice from health care professionals that starts with pregnancy and continues through the toddler years. To register, go to [email or web address] at any time during your pregnancy.      Baby Feeding in the Hospital: Information, Support and Resources    As you prepare for the birth of your child, you will want to consider options for feeding your baby including breast-feeding and/or baby formula. The American Academy of Pediatrics recommends exclusive breast-feeding for the first six months (although any amount of breast-feeding is beneficial).  However, we also understand that breast-feeding is a personal choice and not for everyone. Whether or not you choose to breast-feed, your decision will be respected by our staff.    There are numerous benefits of breast-feeding; here are a few to consider:    Provides antibodies to protect your baby from infections and diseases    The cost: formula can cost over $1,500 per year    Convenience, no warming up or sterilizing bottles and supplies    The physical contact  with breastfeeding can make babies feel secure, warm and comforted    What ever my feeding choice, what can I expect after I deliver my baby?    Your baby will usually be placed skin-to-skin immediately following birth. The skin to skin contact between you and your baby will be a special and memorable time. The bonding and attachment comforts your baby and has a positive effect on baby s brain development.     Having your baby  room in  with you also helps you start to learn your baby s body rhythms and sleep cycle.      You will also begin to learn your baby s cues (signals) that he or she is ready to feed.    When do I start to feed my baby?  As soon as possible after your baby s birth, you will be encouraged to begin feeding.  In the first couple of weeks, your baby will eat often.  Breastfeeding babies usually eat at least 8 times in 24 hours.  Babies fed formula usually eat at least 7 times in 24 hours.      Breast-feeding tips:    Get comfortable and use pillows for support.    Have your baby at the level of your breast, facing you,  tummy to tummy .      Touch your nipple to your baby s lips so you baby s mouth opens wide (rooting reflex).  Aim the nipple toward the roof of your baby s mouth. When your baby opens his or her mouth, pull your baby toward your breast to help your baby  latch on  to your nipple and much of the areola area.    Hand expressing your breast milk can assist with latching your baby to your breast, if needed.    Ask for help, breastfeeding may seem awkward or uncomfortable at first, this is normal. There are numerous resources available at [facility], Clinics and beyond.     If your goal is to exclusively breastfeed, avoid using any formula or artificial nipples (including bottles and pacifiers) while you are your baby are learning to breastfeed unless there is a medical reason.       Mixing breastfeeding and formula can interfere with how you begin building your milk supply.   It can impact how you and your baby  learn  to breastfeeding together and alter the natural growth of  good  bacteria in your baby s stomach.    Delay a pacifier or a bottle in the first few weeks until breastfeeding is well established. This is often around 3 weeks of age.    Ask your nurse to show you how to hand express.   Breast milk can be kept in the refrigerator or freezer for later use.  (over)  Hospital Resources:  [facility] Clin[facility] located at [facility], [facility] and [facility]  Call: [phone].    Inpatient support    Outpatient appointments    Telephone consultation    Breast-feeding classes available through [employer]      Online Resources:    [email or web address] sign up for free online weekly e-mail    [email or web address]    Breastfeedingmadesimple.com    Llli.org (La Leche League)    Normalfed.com    Womenshealth.gov/breastfeeding    Workandpump.com    Breast-feeding Supplies & Pumps:  Talk to your insurance provider or WIC (Women, Infants and Children) to learn more about options available to you. Recent health insurance changes may include additional coverage for supplies and pumps.    Public Health:  Women, Infants and Children Nutrition program (WIC): provides breast-feeding support and education in addition to formal feeding moms. 548-YMV-9933 or [email or web address].[email or web address]/[email or web address]/fh/wic    Family Health Home Visiting: Public Health Nurse home visits are available. Talk to your provider to see if you qualify. Most [facility] have a program available.    Additional Resources:  La Leche League is an international, nonprofit, nonsectarian organization offering information, education, and support to mothers who want to breast-feed their babies. Local groups offer phone help and monthly meetings. Visit Backplane.[employer] or [facility].org and us the  Find local support  drop down menu or click on the  Resources  tab.    [place] Breastfeeding  Resources: 7-211-112-BABY (8013) toll free    National Breastfeeding Help Line trained breastfeeding peer counselors can help answer common breast-feeding questions by phone. Monday-Friday: English/Turkish  2-042- 578-9031 toll free, [phone] (TTY)    [facility] Connection: [phone]-[facility] ([phone])

## 2021-05-31 NOTE — PROGRESS NOTES
Discussed discharge instructions with the patient. Discussed that she is okay to go now or stay for an hour to monitor. Baby looks great on the monitor. Patient decided to leave now.

## 2021-05-31 NOTE — PROGRESS NOTES
"SUBJECTIVE:  Claritza Obando is a 23 y.o. female  at 32w0d by exact LMP c/w 10 wk US. Estimated Date of Delivery: 10/25/19.  She is doing well. She denies vaginal bleeding, leakage of fluid, or urinary symptoms. Fetal movement is present. She is not having contractions.  Concerns: Had some pelvic pain, intermittently that resolved. Also having some right sided rib pain- feels like baby kicking.   ROS  Negative except as noted above in HPI  OBJECTIVE:  Blood pressure 124/58, pulse 80, temperature 98.2  F (36.8  C), temperature source Oral, resp. rate 20, height 5' 1.25\" (1.556 m), weight 145 lb (65.8 kg), last menstrual period 2019, unknown if currently breastfeeding.  Gen: Comfortable, no acute distress.  Abd: Gravid, non-tender  See OB Vitals flowsheet.  ASSESSMENT/PLAN:  -IUP at 32w0d:   Prenatal labs reviewed  Fetal survey was done and normal. Vertex at last visit.   Peripartum Anesthesia: the patient does desire an epidural during labor.    Breast/Bottle: breastfeed   Reviewed plans for getting to the hospital.  RTC in 2 weeks or sooner with problems.    Brenda Shaikh MD    "

## 2021-05-31 NOTE — PROGRESS NOTES
"SUBJECTIVE:  Claritza Obando is a 23 y.o. female  at 28w0d. Estimated Date of Delivery: 10/25/19.  She is doing well. She denies vaginal bleeding, leakage of fluid, or urinary symptoms. Fetal movement is present. She is not having contractions.  Concerns: wondering if baby is still breech. Having gender reveal party next week.  ROS  Negative except as noted above in HPI  OBJECTIVE:  Blood pressure 116/46, pulse 88, temperature 98.1  F (36.7  C), temperature source Oral, resp. rate 24, height 5' 1.25\" (1.556 m), weight 138 lb (62.6 kg), last menstrual period 2019, unknown if currently breastfeeding.  Gen: Comfortable, no acute distress.  Abd: Gravid, non-tender  See OB Vitals flowsheet.  ASSESSMENT/PLAN:  Claritza was seen today for routine prenatal visit.    Diagnoses and all orders for this visit:    Pregnancy, unspecified gestational age  -     Breast pump, double electric      -IUP at 28w0d:   Prenatal labs reviewed   Anemia- on oral iron  Fetal survey was done and normal. Vertex by bedside US today   Peripartum Anesthesia: the patient does desire an epidural during labor.    Breast/Bottle: breast. Breast pump script given today  Reviewed plans for getting to the hospital.  RTC in 2 weeks or sooner with problems. Plan for tdap at next visit      Brenda Shaikh MD    "

## 2021-05-31 NOTE — TELEPHONE ENCOUNTER
JALYN reviewed request. MD has only reserved slots that fit this request. We can reappoint as requested with express MD approval. Thank you.

## 2021-05-31 NOTE — PROGRESS NOTES
08/13/19 1945   Provider Notification   Provider Name/Title dr. Shaikh   Method of Notification Phone   Request Evaluate - remote   Notification Reason Labor status;Status update;Other (Comment)     Patient had a fall at 1730 outside a grocery store and slipped and fell on her bottom. efm category I with accls, no contractions or leaking of fluid, patient reports no pain at this time, no bruising on her abdomen, orders for discharge now or in an hour if patient requests. Follow up on Friday at scheduled appt

## 2021-05-31 NOTE — PROGRESS NOTES
"SUBJECTIVE:  Claritza Obando is a 23 y.o. female  at 30w0d by exact LMP c/w 10 wk US. Estimated Date of Delivery: 10/25/19.  She is doing well. She denies vaginal bleeding, leakage of fluid, or urinary symptoms. Fetal movement is present. She is not having contractions.  Concerns: went to L&D triage recently after accidental fall on bottom.   ROS  Negative except as noted above in HPI  OBJECTIVE:  Blood pressure 122/58, pulse 92, temperature 98.2  F (36.8  C), temperature source Oral, resp. rate 24, height 5' 1.25\" (1.556 m), weight 141 lb (64 kg), last menstrual period 2019, unknown if currently breastfeeding.  Gen: Comfortable, no acute distress.  Abd: Gravid, non-tender.  See OB Vitals flowsheet.  ASSESSMENT/PLAN:  Claritza was seen today for routine prenatal visit.    Diagnoses and all orders for this visit:    Pregnancy, unspecified gestational age    Other orders  -     Tdap vaccine,  8yo or older,  IM      -IUP at 30w0d:   Prenatal labs reviewed   Fetal survey was done and normal. Vertex on bedside US at last visit.   Peripartum Anesthesia: the patient does desire an epidural during labor.    Breast/Bottle: breastfeed, received her breast pump   Reviewed plans for getting to the hospital.  RTC in 2 weeks or sooner with problems.    Brenda Shaikh MD    "

## 2021-05-31 NOTE — TELEPHONE ENCOUNTER
Upcoming Appointment Question  When is the appointment: 09-13-19  What is your appointment for?: Routine OB visit  Who is your appointment scheduled with?: PCP only  What is your question/concern?: Patient wondering if PCP can fit her in on Wednesday 09-11-19 or Thursday 09-12-19 after 4 pm.  Patient needs to switch her appointment that is scheduled for Friday 09-13 to Wednesday or Thursday of that same week after 0400 pm. Patient on wait list also, did not want to cancel until hears back from clinic. Thanks.  Okay to leave a detailed message?: Yes

## 2021-05-31 NOTE — TELEPHONE ENCOUNTER
Phone Triage For Labor Patient    2019  9:41 PM       EDC10/  Are you having contractions: no  When did they start: n/a  How often are they, how long do they last: n/a  Can you carry on regular activities through contractions: n/a  Do you need to change your breathing with contractions: n/a      What was your last cervical exam: none  Date of last office visit:   Are you leaking any fluid: no  Description of fluid or blood: none  Is your baby moving as usual: yes  Do you have any other concerns or questions: Patient calls with c/o pubic pain x 5 min. Sharp when laying down, intermittent when sitting.  Has been on her feet all day at work.  Instructed to take a warm tub bath for comfort.  If pain not relieved within the hour to call back.    Patient comments or concerns: none          Advice given to patient: tub, call back if pain not relieved.            Otilia Sparks

## 2021-06-01 VITALS — HEIGHT: 61 IN | BODY MASS INDEX: 19.07 KG/M2 | WEIGHT: 101 LBS

## 2021-06-01 VITALS — BODY MASS INDEX: 19.83 KG/M2 | HEIGHT: 61 IN | WEIGHT: 105 LBS

## 2021-06-01 NOTE — PROGRESS NOTES
"SUBJECTIVE:  Claritza Obando is a 23 y.o. female  at 34w5d by exact LMP c/w 10 wk US. Estimated Date of Delivery: 10/25/19.  She is doing well. She denies vaginal bleeding, leakage of fluid, or urinary symptoms. Fetal movement is present. She is not having contractions.  Concerns: Having more back pain. Swelling in feet L>R. No calf tenderness. No headaches, blurry vision, RUQ/epigastric pain.  ROS  Negative except as noted above in HPI  OBJECTIVE:  Blood pressure 126/74, pulse 96, temperature 97.7  F (36.5  C), temperature source Oral, resp. rate 16, height 5' 1.25\" (1.556 m), weight 153 lb (69.4 kg), last menstrual period 2019, SpO2 98 %, unknown if currently breastfeeding.  Gen: Comfortable, no acute distress.  Abd: Gravid, non-tender  Ext: 1+ edema bilaterally  See OB Vitals flowsheet.  ASSESSMENT/PLAN:  Claritza was seen today for routine prenatal visit.    Diagnoses and all orders for this visit:    Pregnancy, unspecified gestational age  -     US OB > = 14 Weeks; Future    Uterine size-date discrepancy in third trimester: Size>Dates and with excessive weight gain in pregnancy- will obtain growth US.  -     US OB > = 14 Weeks; Future    Excessive weight gain during pregnancy in third trimester: Excessive weight gain, 43 lbs so far. Reviewed risks of excesssive weight gain- encouraged continued dietary changes, small carb portions. Continue to monitor closely.      -IUP at 34w5d:   Prenatal labs reviewed   Anemia- resolved with oral iron  Fetal survey was done and normal.  Peripartum Anesthesia: the patient DOES desire an epidural during labor.   Breast/Bottle: breastfeed- has pump  Reviewed plans for getting to the hospital. She desires to deliver at [facility]- she prefers to continue with prenatal care here until she delivers. She has called [facility] to discuss this- she understands she would be delivered by on-call physician.  RTC in 1 weeks or sooner with problems. Plans to see Dr. Mcclure next week " while I am out of office.      Brenda Shaikh MD

## 2021-06-01 NOTE — PATIENT INSTRUCTIONS - HE
Patient Education   HEALTHY PREGNANCY CARE: 34-36 WEEKS PREGNANT    Your baby is gaining about an ounce each day, so healthy nutrition and rest are still very important. Learn about late pregnancy symptoms, such as constipation and backaches. Drinking more fluids and eating more fiber can relieve constipation. The pelvic tilt and a heating pad can ease backaches.    Continue to watch for signs and symptoms of preeclampsia:     Sudden swelling of your face, hands, or feet     New vision problems such as blurring, double vision, or flashing lights    A severe headache not relieved with acetaminophen (Tylenol)    Sharp or stabbing pain in your right or middle upper abdomen    You're almost done with your pregnancy but it's still too soon to have your baby. The goal is to have your baby after 37 weeks, so watch for signs and symptoms of premature labor:     Regular contractions. This means having about 6 or more within 1 hour, even after you have had a glass of water and are resting.     A backache that starts and stops regularly.    An increase or change in vaginal discharge, such as heavy, mucus-like, watery, or bloody discharge.     Your water breaks or leaks.    You will be tested for group B streptococcus (GBS), a type of bacteria found in 10-30% of pregnant women. A woman with GBS can pass it to her baby during delivery. Most babies who get GBS from their mothers do not have any problems, but some babies get very ill and need to be hospitalized for antibiotic treatment. Treating you with antibiotics during labor and delivery helps to prevent infection in your baby.    Review information on postpartum care that you will need after the baby is born.  Discuss your choices and plans for birth control with your midwife or physician.     Postpartum Care  During the days and weeks after the delivery of your baby (postpartum period), your body will change as it returns to its nonpregnant condition. As with pregnancy  "changes, postpartum changes are different for every woman.    Physical changes after childbirth  The changes in your body may include:    Contractions called afterpains shrink the uterus for several days after childbirth. Shrinking of the uterus to its prepregnancy size may take 6 to 8 weeks.    Sore muscles (especially in the arms, neck, or jaw) are common after childbirth. This is because of the hard work of labor and pushing your baby out. The soreness should go away in a few days.    Bleeding and vaginal discharge (lochia) may last for 2 to 8 weeks, and can come and go for about 2 months.    Vaginal soreness, including pain, discomfort, and numbness, is common after vaginal birth. Soreness may be worse if you had a perineal tear or episiotomy.    If you had a  birth (), you may have pain in your lower abdomen and may need pain medicine for 1 to 2 weeks.    Breast engorgement is common around the 3rd or 4th day after delivery, when the breasts begin to fill with milk. This can cause discomfort and swelling. If you are breast feeding, the best treatment is to feed your baby often or pump the milk out. You can also use warm compresses. If you are not breast feeding, placing ice packs on your breasts, taking a hot shower, or using warm compresses may relieve the discomfort.    Be aware of postpartum depression    \"Baby blues\" are common for the first 1 to 2 weeks after birth. You may cry or feel sad or irritable for no reason.     For some women, these feelings last longer and are more intense. This is called postpartum depression.     If your symptoms last for more than a few weeks or you feel very depressed, ask your midwife or physician for help.     Postpartum depression can be treated. Support groups and counseling can help, but sometimes medication is needed.     Discuss follow-up appointments with your midwife or physician, as well. He or she will usually want to see you 6 weeks after the " birth of your baby, sooner if you are having problems.    If you have questions about any symptoms you are experiencing or any other concerns, call your provider or their clinic staff at [facility] MEDICINE/OB  at Phone: [phone]. If it's after clinic hours, physician patients should call the Care Connection at [phone] ([phone]); midwife patients should call their answering service at [phone].    How can you care for yourself at home?   You can refer to the Starting Out Right book or find it online at [email or web address]/images/stories/http://www.[email or web address]/images/stories/[email or web address]-[email or web address]-Right.pdf or http://www.[facility].org/images/stories/flipbooks/[facility]-starting-out-right/[facility]-starting-out-right.html#p=8     You can sign up for a weekly parenting e-mail that gives support, tips and advice from health care professionals that starts with pregnancy and continues through the toddler years. To register, go to [email or web address] at any time during your pregnancy.    Making Early Breastfeeding or Chestfeeding Work: What's Important?  Breastfeeding/chestfeeding is important!     It helps keep babies healthy.    Parents who breastfeed/chestfeed have lower risks of breast and ovarian cancer.    It's convenient: the milk is always ready and warm, and there is nothing to mix or prepare for feeding.    Formula is harder for your baby to digest.    It helps you bond with your baby and protects against postpartum depression.  Lots of early skin-to-skin contact with your baby    Place your naked baby with baby's belly against your bare chest. Cover baby's back with a blanket    Start skin-to-skin right after birth, as soon as you are ready    Skin-to-skin:  ? Helps keep baby warm  ? Improves baby's oxygen and blood sugar levels  ? Helps your uterus contract and bleed less  ? Helps baby feel calm and comforted  ? Helps you feel close to baby  ? Helps get breastfeeding  "started. Being close makes latching on easier, and baby may move over to the nipple and latch without help  ? Baby breastfeeds better and longer when skin-to-skin  Placing baby well for good attachment to the breast or chest    Hold your baby close with baby's tummy touching your tummy.    Wait for baby to open mouth wide, then bring baby onto the breast/chest.    Baby should take a big mouthful of breast/chest, not just the nipple. This helps baby get more milk, and the suckling should feel comfortable.    When baby is latched well to the breast/chest, nipples aren't cracked or painful.  Keeping baby near (called \"rooming-in\" at the hospital)    Baby sleeps better and cries less when birth parent is near. Your room is quiet.    We will place your baby safely on their back in their bassinette. This lets you practice safe sleep for your baby while keeping them at your bedside.    Baby feeds more often, which means your milk supply increases faster, and your baby loses less weight.    Parents have an easier time getting to know and bonding with baby.    Parents feel much more confident about baby care and breastfeeding/chestfeeding.    Maternity staff can help at any time.  Feeding on cue    Feeding on cue simply means feeding whenever your baby shows signs of hunger    Crying is a late hunger sign. Feed baby whenever baby wants for as long as baby wants.    Feeding signs: mouth movements, sticking the tongue out, rooting (baby turns toward chest and may open mouth), hand-to-mouth movements    Advantages of feeding on cue:  ? Frequent breastfeeding/chestfeeding in the first weeks after birth gives you a good milk supply for months to come.  ? Babies settle into a relaxing feed more quickly. Babies enjoy feedings more when they don't have to cry to be fed.  ? Feeding is comfort as well as nutrition. Newborns love constant closeness and feeding and can't be held \"too much\" or \"spoiled.\"  ? Newborns need small frequent " "feedings in the first days of life. Just one to three teaspoons fill a new baby's stomach.  ? Responding to feeding cues helps babies gain weight.  Feeding only human milk in the first six months    Colostrum is the first milk that baby gets at birth. The amount of colostrum matches the baby's stomach, so it will not be overfilled.    The small volumes ready at birth are also easier for baby to handle.    All babies lose weight in the first few days. This is simply \"water weight.\"    Introducing food or fluids other than human milk too early can cause problems for breastfeeding/chestfeeding and for your baby's health.    Feeding only human milk maximizes the protection against disease and infections.    Your body knows how much milk to make by how often your baby feeds. If you give your baby formula, your body may not know how much milk to make.    For informational purposes only. Not to replace the advice of your health care provider. Adapted with permission from \"Getting Started with Infant Care and Breastfeeding,\" by DULCE Degroot, MALAIKA, Mary Love, RN, IBCLC, Claudia Magallanes MD, MALAIKA, and Daniella Cantor MD, IBCLC. [place] [employer], 2017. Clinically reviewed by [facility]. [place] [id number] - 05/19.        [facility] Breastfeeding Resources       Research shows that human milk offers the best  nutrition and protection for babies. So at [facility],  we care for families and babies in a way that  promotes, teaches and supports lactation. We  support all breastfeeding, chestfeeding and human  milk feeding families, as well as families who can t  breastfeed / chestfeed or who choose not to do so.  A mother or caregiver s own milk is best for a baby,  but if you are unable to breastfeed / chestfeed, we  may suggest pasteurized donor human milk for your  baby while in the hospital.    Lactation support    The following [facility] locations offer  individual outpatient " lactation consults. Some  locations offer phone or group lactation consults  with certified lactation consultants. Call to confirm  services and for information and appointments. You  may wish to call your insurance company first to see  if they will cover the cost of a consult.    [facility]: [phone]    [facility]: [phone]    [facility]: [phone]  Offers [place] First Days program, which includes  group lactation visits and one free information session  about delivering your baby at a designated [facility] hospital and the importance and benefits  of breastfeeding and human milk. These group visits  also help patients with feeding concerns and offer  information about other postpartum topics.                For informational purposes only. Not to replace the advice of your health care  provider. Copyright   2005 [facility]. All rights reserved. [employer] 151997 - REV .   [facility] ([place]):  [phone]    [facility] [place]):  [phone] or [phone]    [facility]):  [phone]    [facility]  ([place]): [phone]    [facility] ([place]):  [phone] or [phone]  Includes follow-up visits for caregivers of babies  discharged from the  intensive care unit  (NICU) at [facility].    [facility]):  [phone]    [facility] ([facility],  [facility], primary care clinics):  [phone]  Also offers weight checks, feeding discussions and support with a lactation consultant as a part of free, weekly support group.    [facility]: [phone]  Also offers a free weekly support group.                                                                                                                                                                                                       (continued)   You may also call [place] [facility] at [phone]  for information about [place] and [facility]  locations that offer lactation support. For information  about breastfeeding / chestfeeding and childbirth  classes in the [facility], go to [facility] at www.[email or web address]. For [facility], go to [email or web address] and click on  Classes and Events at the bottom of the page. Or, call  [phone].    Supplies    You can get breast pumps from the Birthplace nurses  at [facility] or [facility]  nurses at [facility]. Call your health  insurance to see if they will cover the cost of the  pump. Tell your nurse you d like a pump. They will  help you fill out the right paperwork. The pump will  be ready for you when you leave the hospital.    If you decide to get a pump after you leave the  hospital, you can get one from our partners at  [facility]. [facility] carries a range of  feeding supplies and pumps. Please call your health  insurance to see if they will cover the cost of the  pump. Then call [facility]  to find out what supplies and pumps are available.  Some stores may deliver the pump to your home.    [facility]:  [email or web address] Other lactation services    [facility]: [phone] (24 hours a day)  www.[email or web address]  Offers support for breastfeeding / chestfeeding and  human milk feeding families. Call to find a group in  your area.    National [facility]:  [phone]  www.womenshealth.gov/breastfeeding  Offers a breastfeeding / chestfeeding information  line in English and Turkmen.    WIC (Women, Infants and Children) Program:  [phone]  Offers breastfeeding / chestfeeding counseling. Call  to find an office near you.    Milk  banking    [facility]  [email or web address]  [phone]  Consider freezing your extra collected milk to donate  to babies in need. Email or call for information.                           If you are deaf or hard of hearing, please let us know. We provide many free services including  sign language interpreters, oral interpreters, TTYs, telephone amplifiers, note takers and written materials.

## 2021-06-01 NOTE — PROGRESS NOTES
"SUBJECTIVE:  Claritza Obando is a 23 y.o. female  at 33w5d by exact LMP c/w 10 wk US. Estimated Date of Delivery: 10/25/19.  She is doing well. She denies vaginal bleeding, leakage of fluid, or urinary symptoms. Fetal movement is present. She is not having contractions.  Concerns: She is here today with her boyfriend, Jitendra. Patient does not feel comfortable delivering at [facility] due to family members who reportedly had poor experience- she is wondering if I deliver at [place] or [facility].  ROS  Negative except as noted above in HPI  OBJECTIVE:  Blood pressure 104/64, pulse 76, temperature 97.8  F (36.6  C), temperature source Oral, resp. rate 16, height 5' 1.25\" (1.556 m), weight 151 lb (68.5 kg), last menstrual period 2019, SpO2 98 %, unknown if currently breastfeeding.  Gen: Comfortable, no acute distress.  Abd: Gravid, non-tender  See OB Vitals flowsheet.  ASSESSMENT/PLAN:  Claritza was seen today for routine prenatal visit.    Diagnoses and all orders for this visit:    Pregnancy, unspecified gestational age  -     Hemoglobin    Excessive weight gain during pregnancy in third trimester: Weight gain 41 lbs so far- have discussed nutrition recommendations at length and weight gain goals and risks of excessive weight gain. Continue to closely monitor. Fundal height appropriate. Will consider growth US to evaluate growth close to YOSI.      -IUP at 33w5d:   Prenatal labs reviewed   Fetal survey was done and normal.   Peripartum Anesthesia: the patient does desire an epidural during labor.   Post-partum Contraception: unsure   Breast/Bottle: breast, has pump   Reviewed plans for getting to the hospital.  I discussed that I would deliver at [facility]; however my group does not do postpartum rounds so she would be on the residency service- she will continue to think about this. Offered support that this is her decision and I will support her in wherever she decides to deliver.   RTC in 1 weeks or sooner " with problems.      Brenda Shaikh MD

## 2021-06-01 NOTE — PROGRESS NOTES
"SUBJECTIVE:  Claritza Obando is a 23 y.o. female  at 35w6d by 10 week u/s. Estimated Date of Delivery: 10/25/19.  She is doing well. She denies vaginal bleeding or fluid leaking. Fetal movement is present. She is not having contractions.  Concerns: feet are still swollen. Trying to elevate them and sit at work when possible.   OBJECTIVE:  Blood pressure 120/72, pulse 94, temperature 98.1  F (36.7  C), temperature source Oral, resp. rate 16, height 5' 1.25\" (1.556 m), weight 155 lb (70.3 kg), last menstrual period 2019, SpO2 98 %, unknown if currently breastfeeding.  Comfortable, no acute distress.  See OB Vitals flowsheet.  ASSESSMENT/PLAN:  Claritza was seen today for routine prenatal visit.    Diagnoses and all orders for this visit:    Encounter for supervision of other normal pregnancy in third trimester  -     Group B Strep Screen by PCR    Excessive weight gain during pregnancy in third trimester        -IUP at 35w6d:   Prenatal labs reviewed and Normal.   Fetal survey was done at 20 weeks and was normal.   GBS swab today  Peripartum Anesthesia: the patient does desire an epidural during labor.   Post-partum Contraception: not discussed today   Breast/Bottle: breast   Reviewed plans for getting to the hospital- plans to deliver at [place]. Has appt with [place] [facility] group on . Would like to continue prenatal care with Dr. Shaikh until delivery and then see her postpartum and bring baby here.  RTC in 1 week or sooner with problems.        Zoe Mcclure MD    "

## 2021-06-02 VITALS — WEIGHT: 108 LBS | BODY MASS INDEX: 20.39 KG/M2 | HEIGHT: 61 IN

## 2021-06-02 VITALS — BODY MASS INDEX: 20.77 KG/M2 | WEIGHT: 110 LBS | HEIGHT: 61 IN

## 2021-06-02 NOTE — TELEPHONE ENCOUNTER
Okay to add  to my schedule for Monday morning. Okay to double book. Thanks so much. He was born at outside hospital so will need a chart created.    Brenda Shaikh

## 2021-06-03 VITALS — HEIGHT: 61 IN | WEIGHT: 138 LBS | BODY MASS INDEX: 26.06 KG/M2

## 2021-06-03 VITALS — WEIGHT: 125 LBS | BODY MASS INDEX: 23.6 KG/M2 | HEIGHT: 61 IN

## 2021-06-03 VITALS
BODY MASS INDEX: 28.89 KG/M2 | RESPIRATION RATE: 16 BRPM | HEART RATE: 96 BPM | TEMPERATURE: 97.7 F | HEIGHT: 61 IN | SYSTOLIC BLOOD PRESSURE: 126 MMHG | DIASTOLIC BLOOD PRESSURE: 74 MMHG | OXYGEN SATURATION: 98 % | WEIGHT: 153 LBS

## 2021-06-03 VITALS
OXYGEN SATURATION: 98 % | TEMPERATURE: 98.1 F | WEIGHT: 155 LBS | BODY MASS INDEX: 29.27 KG/M2 | SYSTOLIC BLOOD PRESSURE: 120 MMHG | HEART RATE: 94 BPM | RESPIRATION RATE: 16 BRPM | HEIGHT: 61 IN | DIASTOLIC BLOOD PRESSURE: 72 MMHG

## 2021-06-03 VITALS — WEIGHT: 145 LBS | HEIGHT: 61 IN | BODY MASS INDEX: 27.38 KG/M2

## 2021-06-03 VITALS — WEIGHT: 133 LBS | BODY MASS INDEX: 25.11 KG/M2 | HEIGHT: 61 IN

## 2021-06-03 VITALS — BODY MASS INDEX: 26.62 KG/M2 | HEIGHT: 61 IN | WEIGHT: 141 LBS

## 2021-06-03 VITALS
HEIGHT: 61 IN | TEMPERATURE: 97.8 F | RESPIRATION RATE: 16 BRPM | DIASTOLIC BLOOD PRESSURE: 64 MMHG | SYSTOLIC BLOOD PRESSURE: 104 MMHG | HEART RATE: 76 BPM | BODY MASS INDEX: 28.51 KG/M2 | OXYGEN SATURATION: 98 % | WEIGHT: 151 LBS

## 2021-06-03 VITALS
HEART RATE: 86 BPM | HEIGHT: 61 IN | WEIGHT: 130 LBS | OXYGEN SATURATION: 98 % | DIASTOLIC BLOOD PRESSURE: 68 MMHG | BODY MASS INDEX: 24.55 KG/M2 | SYSTOLIC BLOOD PRESSURE: 122 MMHG | TEMPERATURE: 98.6 F | RESPIRATION RATE: 16 BRPM

## 2021-06-03 VITALS — WEIGHT: 114 LBS | HEIGHT: 61 IN | BODY MASS INDEX: 21.52 KG/M2

## 2021-06-03 VITALS — BODY MASS INDEX: 26.06 KG/M2 | WEIGHT: 138 LBS | HEIGHT: 61 IN

## 2021-06-03 NOTE — TELEPHONE ENCOUNTER
I know you are not on call for Dr. Shaikh, however this is a question to do with breastfeeding and I was hoping you could answer it, so I can relay it to the pt.  Thanks.

## 2021-06-03 NOTE — PROGRESS NOTES
Assessment/Plan:     Claritza was seen today for postpartum care.    Diagnoses and all orders for this visit:    Routine postpartum follow-up    General counseling for prescription of oral contraceptives  -     norethindrone (ORTHO MICRONOR) 0.35 mg tablet; Take 1 tablet (0.35 mg total) by mouth daily.        Anemia:  Normal antepartum hemoglobin with no excessive blood loss  Breastfeeding/Bottle feeding:  Pt is breasfeeding, discussed how to support  Contraception:   Had long discussion, she is exclusively breastfeeding, discussed combined OCPs vs progestin-only pills vs LARCs. She desires pills, will start with micronor combined with exclusive breastfeeding.   Depression:   See [facility] Depresion survey  Exercise:   Encouraged increasing exercise based on how she is feeling.  Healthcare maintenance:   Up to date  Immunizations:    Immunizations up to date          Subjective:      Claritza Obando is a 23 y.o. female who presents for a postpartum visit.   She is 7 weeks postpartum following a  vaginal delivery at 36 weeks at [facility].  2nd degree perineal laceration.   I have fully reviewed the prenatal and intrapartum course in care-everywhere.  Postpartum course has been Unremarkable.  Baby's course has been Uncomplicated.  Periods:  None Patient's last menstrual period was 2019 (exact date).   Bowel or bladder concerns: none  Patient has not resumed intercourse.    [place]  Depression Scale EPDS Total Score: 5 today. Discussed signs of postpartum depression and anxiety and treatment options.     Last hgb on file:    Lab Results   Component Value Date    HGB 11.7 (L) 2019        The following portions of the patient's history were reviewed and updated as appropriate: allergies, current medications and problem list     OB History    Para Term  AB Living   2 1   1 1 1   SAB TAB Ectopic Multiple Live Births   1       1      # Outcome Date GA Lbr Chucky/2nd Weight  Sex Delivery Anes PTL Lv   2  10/01/19 36w4d  7 lb 7.2 oz (3.379 kg) M Vag-Spont EPI Y CLAYTON   1 2018 8w0d            Graham May    Objective:      Vitals:    19 1430   BP: 122/68   Pulse: 86   Resp: 16   Temp: 98.6  F (37  C)   SpO2: 98%       Physical Exam:  General Appearance: Alert, cooperative, no distress, appears stated age  Abdomen: Soft, non-tender, no masses, no organomegaly  Pelvic:Normally developed genitalia with no external lesions or eruptions. Well healed laceration.  Psych: normal mood and affect    Brenda Shaikh MD

## 2021-06-08 NOTE — PROGRESS NOTES
Clinic Progress Note              PRIMARY ISSUES :  Neck pain      SUBJECTIVE :       She has been having pain on the  left side of the neck.  This has been going on since about a week.  No radiation to the left arm  No weakness or tingling in the left arm.  The pain is mild to moderate in intensity and gets worse with movement.    No h/o  trauma to the neck.      No cardiorespiratory, abdominal or neuro symptoms.        ASSESSMENT :      1. Neck pain, acute with out neurological symptoms     2. Neck Muscle Sprain : recommended ibuprofen prn, flexeril and rest.        F/u : in 7-10 days if needed        TIME SPENT : 20  minutes            Objective :            Review of Systems   A 12 point comprehensive review of systems was negative except as noted.        Physical Exam    HEENT : no distended veins, no lymphadenopathy, thyroid is normal, Limited ROM of the Neck on movement.  LUNGS : b/l air entry present, no significant crackles/wheezing.  ABDOMEN : soft, non-tender, non-distended, BS present.  HEART :  Regular rate & rhythm, S1 & S2 normal, no murmur, clicks/rubs, no ankle edema  NEURO : conscious, oriented, responds to commands, no obvious focal deficit.  MUSCULOSKELETAL / EXTREMITIES :  no calf tenderness.  SKIN : no rashes  BACK : wnl        Pertinent Labs   Lab Results: personally reviewed.   Lab Results   Component Value Date     10/10/2016    K 4.6 10/10/2016     (H) 10/10/2016    CO2 24 10/10/2016    BUN 16 10/10/2016    CREATININE 0.85 10/10/2016    CALCIUM 9.8 10/10/2016           Pertinent Radiology   Radiology Results: not done  EKG Results: not done              DR. YESSICA AMBRIZ MD

## 2021-06-08 NOTE — PROGRESS NOTES
Subjective:   Claritza comes in for follow-up of her neck pain.  She is having persisting pain.  She states the Flexeril really is not helping her at all.  The pain is keeping her up at nighttime.  It starting to affect her daily as well.  She does do quite a bit of lifting at work.  She denies any arm pain.  She denies arm weakness.      Objective:  HEENT: Cranial nerves all intact.  Sinuses nontender.  Pupils equally round and reactive to light.  Neck: Neck reveals tenderness noted over the posterior cervical musculature.  The left sternocleidomastoid muscle is tender.  The left occiput is tender to palpation.  Mild muscle spasm noted.  The trapezius area is also tender on the left side only.  Rhomboid areas are minimally tender.  Pressure on the posterior facets does not exacerbate any pain down the arm.  Extremities: Hand grasp is normal in both hands.  Pulses are strong.  Motor strength appears normal.      Assessment:  1.  Cervical strain.      Plan:  The patient will start diclofenac sodium 50 mg twice daily.  She will take this with food.  I gave her stretching exercises as well as strengthening exercises for the neck.  She will use heat in the morning and ice at night to the neck area.  If symptoms persist she may benefit from chiropractic or physical therapy.  Follow-up here as needed.

## 2021-06-10 NOTE — TELEPHONE ENCOUNTER
Chart reviewed. Please review findings below. Last visit 8/13/20 for headache. Thanks.     Assessment/Plan:     Claritza was seen today for headache.     Diagnoses and all orders for this visit:     Intractable migraine without aura and without status migrainosus: Based on history, consistent with migraine headache, especially with photophobia and phonophobia. Responds to ibuprofen but then returns. Reviewed starting headache diary, reviewed common triggers. Encouraged stress reduction. No red flag symptoms. Will try imitrex to see if this helps- also advised her to use benadryl at bedtime if needed. If headache does not improve with imitrex, would warrant FTF or follow-up. She will mychart me tomorrow if her headache is not improved.  -     SUMAtriptan (IMITREX) 50 MG tablet; Take 1 tablet by mouth at first sign of headache. May repeat dose after 2 hours. Max dose 200 mg per 24 hours.        Phone call duration:  9 minutes     Brenda Shaikh MD

## 2021-06-10 NOTE — TELEPHONE ENCOUNTER
"RN Triage:    History of migraines \"years ago\".    Has had headache x 5 d.  Is photophobic and sensitive to noise.  Gets occasional chills and sweats.  Temp has gone to 100 one time in past few days, otherwise normal temp.  Ibuprofen and exedrin migraine has not helped.  Home care discussed.  Telephone visit scheduled for today.    Sarah Jason RN  [facility] Nurse Advisor    Reason for Disposition    Unexplained headache that is present > 24 hours    Additional Information    Negative: Difficult to awaken or acting confused (e.g., disoriented, slurred speech)    Negative: Weakness of the face, arm or leg on one side of the body and new onset    Negative: Numbness of the face, arm or leg on one side of the body and new onset    Negative: Loss of speech or garbled speech and new onset    Negative: Passed out (i.e., fainted, collapsed and was not responding)    Negative: Sounds like a life-threatening emergency to the triager    Negative: Followed a head injury within last 3 days    Negative: Traumatic Brain Injury (TBI) is suspected    Negative: Sinus pain of forehead and yellow or green nasal discharge    Negative: Pregnant    Negative: Unable to walk without falling    Negative: Stiff neck (can't touch chin to chest)    Negative: Possibility of carbon monoxide exposure    Negative: SEVERE headache, states 'worst headache' of life    Negative: SEVERE headache, sudden onset (i.e., reaching maximum intensity within 30 seconds)    Negative: Severe pain in one eye    Negative: Loss of vision or double vision    Negative: Patient sounds very sick or weak to the triager    Negative: Fever > 103 F (39.4 C)    Negative: Fever > 100.0 F (37.8 C) and has diabetes mellitus or a weak immune system (e.g., HIV positive, cancer chemotherapy, organ transplant, splenectomy, chronic steroids)    Negative: SEVERE headache (e.g., excruciating) and has had severe headaches before    Negative: SEVERE headache and not " relieved by pain meds    Negative: SEVERE headache and vomiting    Negative: SEVERE headache and fever    Negative: New headache and weak immune system (e.g., HIV positive, cancer chemotherapy, chronic steroid treatment)    Negative: Fever present > 3 days (72 hours)    Negative: Patient wants to be seen    Protocols used: HEADACHE-A-OH

## 2021-06-10 NOTE — TELEPHONE ENCOUNTER
Can you please add patient to my schedule for virtual visit either today or tomorrow. I would like to further discuss her symptoms.    Brenda Shaikh

## 2021-06-10 NOTE — PROGRESS NOTES
"Claritza Obando is a 24 y.o. female who is being evaluated via a billable telephone visit.      The patient has been notified of following:     \"This telephone visit will be conducted via a call between you and your physician/provider. We have found that certain health care needs can be provided without the need for a physical exam.  This service lets us provide the care you need with a short phone conversation.  If a prescription is necessary we can send it directly to your pharmacy.  If lab work is needed we can place an order for that and you can then stop by our lab to have the test done at a later time.    Telephone visits are billed at different rates depending on your insurance coverage. During this emergency period, for some insurers they may be billed the same as an in-person visit.  Please reach out to your insurance provider with any questions.    If during the course of the call the physician/provider feels a telephone visit is not appropriate, you will not be charged for this service.\"    Patient has given verbal consent to a Telephone visit? Yes    What phone number would you like to be contacted at? See chart    Patient would like to receive their AVS by: my chart    Additional provider notes:     Chief Complaint   Patient presents with     Headache     She has had headache on and off for about 5 days  She had migraine headache a few years ago similar to this but otherwise does not get severe headache typically  She does endorse a lot of life stress right now with work mostly  She took the day off yesterday to relax but went back to work today and felt lightheaded occasionally  Headache- throbing and achy in middle of forehead and moves to top of head  +Photophobia  +Phonophobia  She has been eating and drinking normally  Otherwise feels well  No fevers- she did have 1 temp 100F but otherwise all normal  She has been taking ibuprofen, excedrin migraine and those help for a while but then the headache " returns  She does have sensation of numbness down arms briefly when she moves her head fast  Otherwise denies any weakness, numbness, tingling  Denies chest pain, shortness of breath.       Assessment/Plan:    Claritza was seen today for headache.    Diagnoses and all orders for this visit:    Intractable migraine without aura and without status migrainosus: Based on history, consistent with migraine headache, especially with photophobia and phonophobia. Responds to ibuprofen but then returns. Reviewed starting headache diary, reviewed common triggers. Encouraged stress reduction. No red flag symptoms. Will try imitrex to see if this helps- also advised her to use benadryl at bedtime if needed. If headache does not improve with imitrex, would warrant FTF or follow-up. She will mychart me tomorrow if her headache is not improved.  -     SUMAtriptan (IMITREX) 50 MG tablet; Take 1 tablet by mouth at first sign of headache. May repeat dose after 2 hours. Max dose 200 mg per 24 hours.      Phone call duration:  9 minutes    Brenda Shaikh MD

## 2021-06-10 NOTE — TELEPHONE ENCOUNTER
Called and discussed. Likely viral. No fevers, cough or shortness of breath to warrant Covid testing. Will continue supportive cares for a few days. If worsening symptoms, will start taking amoxicillin- I will prescribe.

## 2021-06-16 PROBLEM — Z87.59 HISTORY OF MISCARRIAGE: Status: ACTIVE | Noted: 2019-03-13

## 2021-06-18 NOTE — PROGRESS NOTES
"Assessment/Plan:     Claritza Obando is a 22 y.o.  here for confirmation of pregnancy.    Amenorrhea/Pregnancy: UPT positive. First pregnancy, no history of miscarriage or terminations. LMP 3/30/18, exact. Based on LMP today, she is 9w5d with YOSI of 19. Desires first trimester screening.   - Referred to [facility] for first trimester screening and dating US  - Continue prenatal vitamins  - Not having significant nausea of pregnancy.     Medications were reviewed for safety in pregnancy.  Risk factors identified today: None    Return to clinic in about 2-3 weeks for Initial OB Visit.    Subjective:      Claritza Obando is a 22 y.o. female who presents for evaluation of amenorrhea   Patient's last menstrual period was 2018 (exact date).  Last period was normal  Current contraception: None  Pregnancy is desired. Father of baby is her boyfriend, Jitendra Portillo. They live together and have been together for 5 years. They have told families about the pregnancy and have good support. They live with 2 other roommates and plan to move into their own place at the end of the month.  She works at [employer] and wants to go back to school eventually  Boyfriend, Jitendra Portillo, is working and also in school for psychology.   Current symptoms also include: fatigue and morning sickness.     Risk behaviors:    Tobacco use:  reports that she has never smoked. She has never used smokeless tobacco.  Drug or alcohol use: no      Current Outpatient Prescriptions:      prenatal no115-iron-folic acid 29 mg iron- 1 mg Chew, Chew daily., Disp: , Rfl:       Objective:      /64  Pulse 76  Temp 98.3  F (36.8  C) (Oral)   Resp 16  Ht 5' 1.22\" (1.555 m)  Wt 105 lb (47.6 kg)  LMP 2018 (Exact Date)  SpO2 100%  BMI 19.7 kg/m2  Gen:  A&A, NAD.  Abd: non-tender uterus  FHT's: too early    Lab Review  Results for orders placed or performed in visit on 18   Pregnancy, Urine   Result Value Ref Range    Pregnancy Test, " Urine Positive (!) Negative          Brenda Shaikh MD

## 2021-06-18 NOTE — PROGRESS NOTES
"SUBJECTIVE  Claritza Obando is a 22 y.o. female here for:    Follow-up/miscarriage: During her first trimester screen/dating ultrasound on 6/18 at [facility], ultrasound showed fetal demise around 7 weeks. This was her first pregnancy. She had serum HCG drawn at that time that was 9000. She was counseled on options and she decided for expectant management. She began to have vaginal bleeding and abdominal cramping on 6/21 and went to ED. Ultrasound showed demise with gestational sac. She was vitally stable and hemoglobin was stable and she was discharged home. She reports that later that day, in the afternoon, she felt like she had to go to the bathroom, and she passed the pregnancy (she took picture and shows me the sac and clot). She has been having some continued light vaginal bleeding since that time with mild cramping but overall her symptoms are improving. She reports good emotional support from partner and her family. She would like to try for another pregnancy but wants to wait until she and her partner are emotionally ready. She denies fever/chills, severe abdominal pain.    ROS  Complete 10 point review of systems negative except as noted above in HPI    Reviewed Past Medical History, Medications, Family History and Social History in Epic and up to date with no new changes.    OBJECTIVE  /56  Pulse 63  Temp 98.3  F (36.8  C) (Oral)   Resp 12  Ht 5' 1.02\" (1.55 m)  Wt 101 lb (45.8 kg)  LMP 03/30/2018 (Exact Date)  SpO2 100%  BMI 19.07 kg/m2     General: Cooperative, pleasant, in no acute distress  HEENT: NCAT, PERRL, sclera clear  CV: RRR, normal S1/S2, no murmur, rubs, gallops  Resp: No respiratory distress. Clear to auscultation bilaterally. No wheezes, rales, rhonchi  Abd: Soft, non-tender uterus, no masses, BS+  Psych: Intermittently tearful    LABS & IMAGES   Results for orders placed or performed in visit on 06/25/18   Beta-hCG, Quantitative   Result Value Ref Range    Beta-hCG, " Quantitative 283 (H) 0 - 4 mlU/mL       ASSESSMENT/PLAN:   Claritza was seen today for miscarriage.    Diagnoses and all orders for this visit:    Miscarriage: She had fetal demise that was seen incidentally on her first trimester screen (around 7 week demise). She chose expectant management and based on her history, it sounds like she passed the products of conception on 6/21/18. She still has some very light bleeding but benign abdominal exam, afebrile and vitally stable. Encouraged her to continue taking ibuprofen as needed for pain. I did obtain repeat serum HCG today and encouraged patient to wait until she has negative pregnancy test and has had normal period to conceive again. Encouraged her to continue prenatal vitamins. Offered emotional support and gave her resources.   -     Beta-hCG, Quantitative    Spent 25 min face to face with patient with more the 50% spent in counseling, reviewing chart, and coordination of care and discussing problems listed above.       Options for treatment and follow-up care were reviewed with the patient. Claritza VJ Topher and/or guardian was engaged and actively involved in the decision making process. Claritza ZABALA Topher and/or guardian verbalized understanding of the options discussed and was satisfied with the final plan.      Brenda Shaikh MD

## 2021-06-19 NOTE — LETTER
Letter by Aurelia Georges at      Author: Aurelia Georges Service: -- Author Type: --    Filed:  Encounter Date: 11/4/2019 Status: Signed         November 4, 2019       Claritza Yanesshonda  [address] [place] [address]    Dear Claritza Obando:    We are pleased to provide you with secure, online access to medical information for you and your family within [facility]. Per your request, we have expanded your account to allow access to the records of the following family members:              Graham Portillo (privilege ends on 9/30/2031.)     How Do I Log In?  1. In your Internet browser, go to [email or web address]  2. Log into [employer] using your [employer] Username and Password.  3. Click Sign In.        How Do I Access a Family Member's Account?  4. Select the account you want to access by clicking the [place] with the appropriate patient's name at the top of your screen.   5. You will see a disclaimer page letting you know that you will be viewing a family member's record. Review the disclaimer and then click Accept Proxy Access Disclaimer to proceed.  6. Once you switch to viewing a family member's record, you can navigate to [employer] pages the same way you would for yourself. You can return to your own account by clicking the [place] at the top of the screen with your name on it.    7. To customize the colors and names of the linked accounts, you can select Personalize from the Profile dropdown menu at the top of the screen, then click the Edit button to make changes.     Additional Information  If you have questions, visit [email or web address], e-mail [email or web address] or call [phone] to talk to our [employer] staff. Remember, [employer] is NOT to be used for urgent needs. For medical emergencies, dial 911.

## 2021-06-19 NOTE — LETTER
Letter by Brenda Shaikh MD at      Author: Brenda Shaikh MD Service: -- Author Type: --    Filed:  Encounter Date: 9/18/2019 Status: (Other)         September 18, 2019     Patient: Claritza Obando   YOB: 1995   Date of Visit: 9/18/2019       To Whom it May Concern:    Claritza Obando was seen in my clinic on 9/18/2019. It is my medical recommendation that Claritza is allowed to sit for 10 minutes every 2 hours due to pregnancy related issues. This recommendation will last throughout the duration of her pregnancy- her due date is 10/25/19.     If you have any questions or concerns, please don't hesitate to call.    Sincerely,         Electronically signed by Brenda Shaikh MD

## 2021-06-24 NOTE — TELEPHONE ENCOUNTER
Who is calling:  Patient via [employer]  Reason for Call:  Patient scheduled her own appointment in [place] as an office visit for a confirmation of pregnancy.  Clinic please only contact patient if reschedule is needed.   Date of last appointment with primary care: 06.25.2018  Has the patient been recently seen:  No  Okay to leave a detailed message: Not able to ask, scheduled via [employer]

## 2021-06-24 NOTE — PATIENT INSTRUCTIONS - HE
Patient Education   HEALTHY PREGNANCY CARE: 6 to 10 WEEKS PREGNANT    Pregnancy is an important time for you to take care of yourself and your baby. There is much that you can do through simple things like nutrition and exercise that will help you achieve the best outcome possible.     Learn about the changes you and your baby will experience during pregnancy. Your baby's facial features, brain, spinal cord and internal organs are developing, and baby's heart is pumping blood. Due to hormonal changes, you may notice nausea, fatigue or breast tenderness.    Common Discomforts of Early Pregnancy  Your body goes through many changes during pregnancy. Some are noticeable like increased breast size or darkening of the color of the nipple, but some changes may cause discomfort like breast tenderness, urinary frequency, fatigue or nausea. If you have questions about the duration or severity of what you are experiencing, contact your midwife or physician for guidance.     Coping with nausea/morning sickness    Sip small amounts of water, juices, or shakes. Try drinking between meals, not with meals.     Eat 5 or 6 small meals a day. Try dry toast, crackers, or cereal when you first get up, and eat breakfast a little later.    Make tori tea (sliced tori root in hot water with honey). Sip a cup in the morning before getting up.    Avoid spicy, greasy, and fatty foods.     When you feel sick, open your windows or go for a short walk to get fresh air.     Try nausea wristbands. These help some women.     Call your midwife or physician if you cannot keep fluids down, or if vomiting persists. There are medications that can help.    Choose healthy foods and gain the recommended amount of weight for your size. If you have questions or follow a special diet, talk with your midwife or physician. You should take one prenatal vitamin daily.  If nausea is a problem, try taking only a folic acid supplement of 400-800mcg daily until  the nausea passes.    Follow safe guidelines for exercise. Low impact aerobic activities are generally okay during pregnancy. If you have a regular exercise routine, you should be able to continue it during pregnancy as long as it doesn't cause pain. Talk to your midwife or physician about your activity at your prenatal visits.    You can sign up for a weekly parenting e-mail that gives support, tips and advice from health care professionals that starts with pregnancy and continues through the toddler years. To register, go to [email or web address] at any time during your pregnancy.    Things to Avoid During Pregnancy  A general principal to follow during pregnancy is to stay away from anything that is strong/bad smelling (gas, paint, fumes, etc), or known to cause problems for mom or baby    Smoking (self or others)    Alcohol     Pesticides    Caffeine     Soft cheeses    Fish with high mercury content (such as shark, swordfish, andre mackerel, or tilefish)    Some over the counter meds (ask your midwife or physician before taking)    Changing the sherrell litter box    This is also a good time to think about genetic screening tests. These are tests done during pregnancy to look for possible problems with the baby. First trimester tests for Down's Syndrome, Trisomy 13 and 18 can be done as early as 10 weeks of pregnancy. Some testing can be done as late as 22 weeks of pregnancy, depending on the test. There are other tests that look for spinal defects, cystic fibrosis, Alcon-Sachs disease. Talk with your midwife or physician about testing.    Warning Signs    Watch for warning signs, such as     vaginal bleeding    fluid leaking from your vagina    severe abdominal pain    nausea and vomiting more than 4-5 times a day, or if you are unable to keep anything down    fever more than 100.4 degrees F.     Contact your midwife or physician at [facility] MEDICINE/OB  at Phone: [phone] if you have these or any other  concerns. If it's after clinic hours, physician patients should call the Care Connection at [phone] ([phone]); midwife patients should call their answering service at [phone].    How can you care for yourself at home?   You can refer to the Starting Out Right book or find it online at [email or web address] or [email or web address]       Breastfeeding: a Healthy Option for You and Your Baby    The choice of how you will feed your baby is important.  Before your baby s birth, you ll want to learn about the benefits of breastfeeding.  [facility] have been designated Baby Friendly; an initiative that was created by the World Health Organization and UNICEF.  This helps give you and your baby the best start in feeding their baby.    Why should I breastfeed my baby?    Babies are less likely to develop childhood obesity or diabetes    Babies are less likely to suffer from recurrent ear infections    Babies are less likely to be hospitalized for respiratory conditions    Breast milk is rich in nutrients and antibodies-it is easy to digest    How does it benefit me?    Lowers the risk for diabetes, breast and ovarian cancer and postpartum depression    Moms can lose  baby weight  more quickly    Cost savings - formula can cost well over $1,500 per year    Convenient - no bottles and nipples to sterilize, no measuring and mixing formula    The physical contact with breastfeeding can make babies feel secure, warm and comforted     What about formula?  While you and your baby are staying with us at [facility], we will support whatever feeding choice you make for your baby.    Some important considerations:      The American Academy of Pediatrics, the World Health Organization, and many more organizations recommend exclusive breastfeeding for 6 months  and continued breastfeeding while adding other foods for the first 1-2 years.      Any amount of breastmilk has benefits to both baby and mother.    Giving formula in replacement of breastfeeding can affect mother s milk supply.  If formula is needed, hospital staff will work with you on a plan to help develop your milk supply.    Formula alters the natural growth of good bacteria in the  stomach.     Research has found that first time mothers who offer formula in the hospital have a shorter duration of breastfeeding.    How can I start to prepare?     Start by having a conversation with your medical provider.     Talk with your partner, family and friends.     Attend a prenatal class that includes breastfeeding preparation. Birth and breastfeeding classes are offered by [facility]. Visit [facility] for class information.     After your baby s birth, hospital staff and lactation consultants will help you and your baby get off to a great start with breastfeeding.    Resources:      [facility] clinic and hospital staff will support you throughout your pregnancy, delivery and beyond.  Visit [email or web address] for more details.        A free weekly pregnancy and parenting email is offered by [facility]. Emails include week by week information about your pregnancy, baby s development, breastfeeding and beyond.  Sign up at [email or web address].    [facility] [phone].  Consultants are available for assessment of your breastfeeding concerns, support and education.    [employer] helps mothers worldwide to breastfeed through mother-to-mother support, encouragement, information and education. [employer] promotes breastfeeding as an important element in the healthy development of baby and mother. Monthly classes, support groups and telephone help are offered in your community. To learn more visit [email or web address].    [facility] Care Connection:  [phone] ([phone])

## 2021-06-24 NOTE — PROGRESS NOTES
"Assessment/Plan:     Claritza Obando is a 23 y.o.  here for confirmation of pregnancy.    Amenorrhea/Pregnancy confirmation: UPT positive. YOSI 10/25/19, exact giving LMP 19. 7w5d today. Will get US for confirmation. History of miscarriage last year- discussed warning signs and offered support. Having some nausea of early pregnancy- will try unisom and vitamin B6  - Pregnancy (Beta-hCG, Qual), Urine- positive  -     US OB < 14 Weeks; Future  -     doxylamine (UNISOM) 25 mg tablet; Take 1 tablet (25 mg total) by mouth at bedtime as needed for sleep or nausea (Nausea).  -     pyridoxine, vitamin B6, (VITAMIN B-6) 25 MG tablet; Take 1 tablet (25 mg total) by mouth 3 (three) times a day.      Medications were reviewed for safety in pregnancy.  Risk factors identified today:  History of miscarriage    Return to clinic in about 4 weeks for Initial OB Visit.    Subjective:      Claritza Obando is a 23 y.o. female who presents for evaluation of pregnancy confirmation.  Patient's last menstrual period was 2019 (exact date).   Last period was normal  Current contraception:  none  Pregnancy is desired.   Current symptoms also include: breast tenderness, fatigue, nausea and positive home pregnancy test.     Risk behaviors:    Tobacco use:  reports that  has never smoked. she has never used smokeless tobacco.  Drug or alcohol use: no      Current Outpatient Medications:      prenatal no115-iron-folic acid 29 mg iron- 1 mg Chew, Chew daily., Disp: , Rfl:      doxylamine (UNISOM) 25 mg tablet, Take 1 tablet (25 mg total) by mouth at bedtime as needed for sleep or nausea (Nausea)., Disp: 90 tablet, Rfl: 1     pyridoxine, vitamin B6, (VITAMIN B-6) 25 MG tablet, Take 1 tablet (25 mg total) by mouth 3 (three) times a day., Disp: 90 tablet, Rfl: 3       Objective:      /52   Pulse 88   Temp 98.4  F (36.9  C) (Oral)   Resp 16   Ht 5' 1.02\" (1.55 m)   Wt 108 lb (49 kg)   LMP 2019 (Exact Date)   BMI 20.39 " kg/m    Gen:  A&A, NAD.  Abd: soft, non-tender uterus  FHT's: too early    Lab Review  Results for orders placed or performed in visit on 03/13/19   Pregnancy (Beta-hCG, Qual), Urine   Result Value Ref Range    Pregnancy Test, Urine Positive (!) Negative       Spent 25 min face to face with patient with more the 50% spent in counseling, reviewing chart, and coordination of care and discussing problems listed above.     Brenda Shaikh MD

## 2021-06-26 ENCOUNTER — HEALTH MAINTENANCE LETTER (OUTPATIENT)
Age: 26
End: 2021-06-26

## 2021-07-14 PROBLEM — O26.03 EXCESSIVE WEIGHT GAIN DURING PREGNANCY IN THIRD TRIMESTER: Status: RESOLVED | Noted: 2019-09-11 | Resolved: 2019-11-20

## 2021-07-14 PROBLEM — Z34.90 PREGNANCY, UNSPECIFIED GESTATIONAL AGE: Status: RESOLVED | Noted: 2019-03-13 | Resolved: 2019-11-20

## 2021-08-20 ENCOUNTER — MYC MEDICAL ADVICE (OUTPATIENT)
Dept: FAMILY MEDICINE | Facility: CLINIC | Age: 26
End: 2021-08-20

## 2021-08-20 DIAGNOSIS — R39.9 URINARY SYMPTOM OR SIGN: Primary | ICD-10-CM

## 2021-08-20 RX ORDER — NITROFURANTOIN 25; 75 MG/1; MG/1
100 CAPSULE ORAL 2 TIMES DAILY
Qty: 10 CAPSULE | Refills: 0 | Status: SHIPPED | OUTPATIENT
Start: 2021-08-20 | End: 2021-11-11

## 2021-08-20 NOTE — TELEPHONE ENCOUNTER
Last office visit: 08/13/2020 DR SALOMON MIGRAINES   Applicable meds (last ordered): CIPRO 2016, UTI   Last lab check: 2016, UA/UC   Next appointment: NONE.     No known drug allergies. UA/UC ordered and pended for MD review and signature if appropriate. Thanks.

## 2021-10-16 ENCOUNTER — HEALTH MAINTENANCE LETTER (OUTPATIENT)
Age: 26
End: 2021-10-16

## 2021-11-11 ENCOUNTER — E-VISIT (OUTPATIENT)
Dept: FAMILY MEDICINE | Facility: CLINIC | Age: 26
End: 2021-11-11
Payer: COMMERCIAL

## 2021-11-11 DIAGNOSIS — N39.0 ACUTE UTI (URINARY TRACT INFECTION): Primary | ICD-10-CM

## 2021-11-11 DIAGNOSIS — R39.9 URINARY SYMPTOM OR SIGN: ICD-10-CM

## 2021-11-11 PROCEDURE — 99421 OL DIG E/M SVC 5-10 MIN: CPT | Performed by: FAMILY MEDICINE

## 2021-11-11 RX ORDER — NITROFURANTOIN 25; 75 MG/1; MG/1
100 CAPSULE ORAL 2 TIMES DAILY
Qty: 10 CAPSULE | Refills: 0 | Status: SHIPPED | OUTPATIENT
Start: 2021-11-11 | End: 2022-06-20

## 2021-11-11 NOTE — PATIENT INSTRUCTIONS
Dear Claritza Obando    After reviewing your responses, I've been able to diagnose you with a urinary tract infection, which is a common infection of the bladder with bacteria.  This is not a sexually transmitted infection, though urinating immediately after intercourse can help prevent infections.  Drinking lots of fluids is also helpful to clear your current infection and prevent the next one.      I have sent a prescription for antibiotics to your pharmacy to treat this infection.  I also did place a urine order for you- I would like you to schedule lab-only appointment on my chart or by calling clinic and give a urine sample today. This way, we can make sure the antibiotic we are using will treat the infection that you have.    It is important that you take all of your prescribed medication even if your symptoms are improving after a few doses.  Taking all of your medicine helps prevent the symptoms from returning.     If your symptoms worsen, you develop pain in your back or stomach, develop fevers, or are not improving in 5 days, please contact your primary care provider for an appointment or visit any of our convenient Walk-in or Urgent Care Centers to be seen, which can be found on our website here.    Thanks again for choosing us as your health care partner,    Brenda Shaikh MD    Urinary Tract Infections in Women  Urinary tract infections (UTIs) are most often caused by bacteria. These bacteria enter the urinary tract. The bacteria may come from inside the body. Or they may travel from the skin outside the rectum or vagina into the urethra. Female anatomy makes it easy for bacteria from the bowel to enter a woman s urinary tract, which is the most common source of UTI. This means women develop UTIs more often than men. Pain in or around the urinary tract is a common UTI symptom. But the only way to know for sure if you have a UTI for the healthcare provider to test your urine. The two tests that may be  done are the urinalysis and urine culture.     Types of UTIs    Cystitis. A bladder infection (cystitis) is the most common UTI in women. You may have urgent or frequent need to pee. You may also have pain, burning when you pee, and bloody urine.    Urethritis. This is an inflamed urethra, which is the tube that carries urine from the bladder to outside the body. You may have lower stomach or back pain. You may also have urgent or frequent need to pee.    Pyelonephritis. This is a kidney infection. If not treated, it can be serious and damage your kidneys. In severe cases, you may need to stay in the hospital. You may have a fever and lower back pain.    Medicines to treat a UTI  Most UTIs are treated with antibiotics. These kill the bacteria. The length of time you need to take them depends on the type of infection. It may be as short as 3 days. If you have repeated UTIs, you may need a low-dose antibiotic for several months. Take antibiotics exactly as directed. Don t stop taking them until all of the medicine is gone. If you stop taking the antibiotic too soon, the infection may not go away. You may also develop a resistance to the antibiotic. This can make it much harder to treat.   Lifestyle changes to treat and prevent UTIs   The lifestyle changes below will help get rid of your UTI. They may also help prevent future UTIs.     Drink plenty of fluids. This includes water, juice, or other caffeine-free drinks. Fluids help flush bacteria out of your body.    Empty your bladder. Always empty your bladder when you feel the urge to pee. And always pee before going to sleep. Urine that stays in your bladder can lead to infection. Try to pee before and after sex as well.    Practice good personal hygiene. Wipe yourself from front to back after using the toilet. This helps keep bacteria from getting into the urethra.    Use condoms during sex. These help prevent UTIs caused by sexually transmitted bacteria. Also don't  use spermicides during sex. These can increase the risk for UTIs. Choose other forms of birth control instead. For women who tend to get UTIs after sex, a low-dose of a preventive antibiotic may be used. Be sure to discuss this option with your healthcare provider.    Follow up with your healthcare provider as directed. He or she may test to make sure the infection has cleared. If needed, more treatment may be started.  [place] last reviewed this educational content on 7/1/2019 2000-2021 [employer]. All rights reserved. This information is not intended as a substitute for professional medical care. Always follow your healthcare professional's instructions.

## 2022-01-18 ENCOUNTER — E-VISIT (OUTPATIENT)
Dept: FAMILY MEDICINE | Facility: CLINIC | Age: 27
End: 2022-01-18
Payer: COMMERCIAL

## 2022-01-18 DIAGNOSIS — J01.90 ACUTE SINUSITIS, RECURRENCE NOT SPECIFIED, UNSPECIFIED LOCATION: Primary | ICD-10-CM

## 2022-01-18 PROCEDURE — 99421 OL DIG E/M SVC 5-10 MIN: CPT | Performed by: FAMILY MEDICINE

## 2022-01-18 RX ORDER — GUAIFENESIN 1200 MG/1
1200 TABLET, EXTENDED RELEASE ORAL 2 TIMES DAILY
Qty: 60 TABLET | Refills: 0 | Status: SHIPPED | OUTPATIENT
Start: 2022-01-18 | End: 2023-01-25

## 2022-01-19 NOTE — PATIENT INSTRUCTIONS
The symptoms you describe suggest a viral cause, which is much more common than a bacterial cause. Antibiotics will treat bacterial infections, but have no effect on viral infections. If possible, especially if improving, start with symptom care for the first 7-10 days, then consider seeking further treatment or taking an antibiotic. Bacterial infections generally are more severe, including symptoms such as pus, fever over 101degrees F, or rapidly worsening. If you have sinus symptoms for over 10 days, then please send a message, and I would be happy to send in a prescription for an antibiotic.  You may want to try a nasal lavage (also known as nasal irrigation). You can find over-the-counter products, such as Neti-Pot, at retail locations or make your own at home. Instructions for homemade nasal lavage and more information on the process are available online at http://www.aafp.org/afp/2009/1115/p1121.html.    Dear Claritza Obando    After reviewing your responses, I've been able to diagnose you with?a sinus infection caused by a virus.?     Based on your responses and diagnosis, I have prescribed an oral decongestant pill to treat your symptoms. I have sent this to your pharmacy.?     It is also important to stay well hydrated, get lots of rest and take over-the-counter?tylenol?or ibuprofen if you?are able to?take those medications per your primary care provider to help relieve discomfort.?     It is important that you take?all of?your prescribed medication even if your symptoms are improving after a few doses.? Taking?all of?your medicine helps prevent the symptoms from returning.?     If your symptoms worsen, you develop severe headache, vomiting, high fever (>102), or are not improving in 7 days, please contact your primary care provider for an appointment or visit any of our convenient Walk-in Care or Urgent Care Centers to be seen which can be found on our website?here.?     If your symptoms persist for  more than 10 days total, then I would consider prescribing an antibiotic, as it may be a bacterial infection.     In addition, I have ordered a Covid-19 test, since sinus symptoms can be a sign of the omicron variant of Covid-19.     Thanks again for choosing?us?as your health care partner,?   ?  Brenda Shaikh MD?

## 2022-04-04 ENCOUNTER — MYC MEDICAL ADVICE (OUTPATIENT)
Dept: FAMILY MEDICINE | Facility: CLINIC | Age: 27
End: 2022-04-04
Payer: COMMERCIAL

## 2022-04-04 DIAGNOSIS — J01.90 ACUTE SINUSITIS WITH SYMPTOMS > 10 DAYS: Primary | ICD-10-CM

## 2022-04-04 RX ORDER — OXYMETAZOLINE HYDROCHLORIDE 0.05 G/100ML
2 SPRAY NASAL 2 TIMES DAILY
Qty: 30 ML | Refills: 0 | Status: SHIPPED | OUTPATIENT
Start: 2022-04-04 | End: 2023-01-25

## 2022-04-04 NOTE — TELEPHONE ENCOUNTER
RN request more information from patient and advise home care    Eladia Kapoor RN on 4/4/2022 at 10:03 AM

## 2022-04-04 NOTE — TELEPHONE ENCOUNTER
Patient reporting signs/symptoms of sinusitis, home care not effective.     Please advise    Eladia Kapoor RN on 4/4/2022 at 10:41 AM

## 2022-04-04 NOTE — TELEPHONE ENCOUNTER
I called patient. Symptoms over 1 week. Not improved with supportive cares. I did advise to try nasal saline rinses and afrin nasal spray. Due to severity of symptoms and length of symptoms, I did prescribe antibiotic- if symptoms do not improve in the next day, okay to start antibiotic.    No further follow-up needed.    Brenda Shaikh

## 2022-06-20 ENCOUNTER — MYC REFILL (OUTPATIENT)
Dept: FAMILY MEDICINE | Facility: CLINIC | Age: 27
End: 2022-06-20
Payer: COMMERCIAL

## 2022-06-20 DIAGNOSIS — N39.0 ACUTE UTI (URINARY TRACT INFECTION): ICD-10-CM

## 2022-06-20 DIAGNOSIS — R39.9 URINARY SYMPTOM OR SIGN: ICD-10-CM

## 2022-06-20 NOTE — TELEPHONE ENCOUNTER
Routing refill request to provider for review/approval because:  Drug not on the [facility] refill protocol     Last Written Prescription Date:  11/11/21  Last Fill Quantity: 10,  # refills: 0   Last office visit provider:  8/13/20     Requested Prescriptions   Pending Prescriptions Disp Refills     nitroFURantoin macrocrystal-monohydrate (MACROBID) 100 MG capsule 10 capsule 0     Sig: Take 1 capsule (100 mg) by mouth 2 times daily       There is no refill protocol information for this order          Susie Sanchez, RN 06/20/22 3:34 PM

## 2022-06-21 RX ORDER — NITROFURANTOIN 25; 75 MG/1; MG/1
100 CAPSULE ORAL 2 TIMES DAILY
Qty: 10 CAPSULE | Refills: 0 | Status: SHIPPED | OUTPATIENT
Start: 2022-06-21 | End: 2023-01-25

## 2022-07-23 ENCOUNTER — HEALTH MAINTENANCE LETTER (OUTPATIENT)
Age: 27
End: 2022-07-23

## 2022-10-01 ENCOUNTER — HEALTH MAINTENANCE LETTER (OUTPATIENT)
Age: 27
End: 2022-10-01

## 2023-01-25 ENCOUNTER — OFFICE VISIT (OUTPATIENT)
Dept: FAMILY MEDICINE | Facility: CLINIC | Age: 28
End: 2023-01-25
Payer: COMMERCIAL

## 2023-01-25 VITALS
OXYGEN SATURATION: 98 % | HEART RATE: 84 BPM | TEMPERATURE: 98.4 F | HEIGHT: 61 IN | DIASTOLIC BLOOD PRESSURE: 60 MMHG | WEIGHT: 136 LBS | BODY MASS INDEX: 25.68 KG/M2 | SYSTOLIC BLOOD PRESSURE: 126 MMHG

## 2023-01-25 DIAGNOSIS — N91.2 AMENORRHEA: ICD-10-CM

## 2023-01-25 DIAGNOSIS — Z34.90 PREGNANCY, UNSPECIFIED GESTATIONAL AGE: Primary | ICD-10-CM

## 2023-01-25 LAB — HCG UR QL: POSITIVE

## 2023-01-25 PROCEDURE — 99213 OFFICE O/P EST LOW 20 MIN: CPT | Performed by: FAMILY MEDICINE

## 2023-01-25 PROCEDURE — 81025 URINE PREGNANCY TEST: CPT | Performed by: FAMILY MEDICINE

## 2023-01-25 NOTE — PATIENT INSTRUCTIONS
Patient Education       Adapting to Pregnancy: First Trimester  As your body adjusts during your first trimester of pregnancy, you may have to change or limit your daily activities. You ll need more rest. You may also need to use the energy you have more wisely.   Your changing body  Almost every part of your body is affected as you adapt to pregnancy. The uterus and cervix will start to soften right away. You may not look very pregnant during the first 3 months. But you are likely to have some commonsigns of early pregnancy:   Nausea  Fatigue  Frequent urination  Mood swings  Bloating of the belly  Constipation  Heartburn  Missed or light periods (first trimester bleeding)  Nipple or breast tenderness and breast swelling  It s not too late to start good habits   What matters most is protecting your baby from this moment on. If you smoke, drink alcohol, or use drugs, now is the time to stop. If you need help, talkwith your healthcare provider:   Smoking increases the risk of stillbirth or having a low-birth-weight baby. If you smoke, quit now.  Alcohol and drugs have been linked with miscarriage, birth defects, intellectual disability, and low birth weight. Don't drink alcohol or take drugs.  Tips to relieve nausea  During pregnancy, nausea can happen at any time of the day, but it may be worsein the morning. To help prevent nausea:   Eat small, light meals at frequent intervals.  Drink fluids often.  Get up slowly. Eat a few unsalted crackers before you get out of bed.  Avoid smells that bother you.  Avoid spicy and fatty foods.  Eat an ice pop in your favorite flavor.  Get plenty of rest.  Ask your healthcare provider about taking tori or vitamin B6 for nausea and vomiting.  Talk with your healthcare provider if you take vitamins that upset your stomach.   Work concerns  The end of the first trimester is a good time to discuss working during pregnancy with your employer. Follow your healthcare provider s advice  if your job needs you to stand for a long time, work with hazardous tools, or even sit at a desk all day. Your workspace, workload, or scheduled hours may need to be adjusted. Perhaps you can change body postures more oftenor take an extra break.   Advice for travel  Talk to your healthcare provider first, but the second trimester may be the best time for any travel. You may be advised to avoid certain trips while you re pregnant. Food and water can be concerns in developing countries. Travel by car is a good choice, as you can stop, get out, and stretch. Bring snacks and water along. Fasten the lap belt below your belly, low over your hips. Also besure to wear the shoulder harness.   Intimacy  Unless your healthcare provider tells you to, there's no reason to stop having sex while you re pregnant. You or your partner may notice changes in desire. Desire may be less in the first trimester, due to nausea and fatigue. In the second trimester, sex may be very enjoyable. The third trimester can be a challenge comfort-wise. Try different positions and see what s best for you both.   [employer] last reviewed this educational content on4/1/2020 2000-2022 [employer]. All rights reserved. This information is not intended as a substitute for professional medical care. Always follow yourhealthcare professional's instructions.

## 2023-01-25 NOTE — PROGRESS NOTES
"Assessment/Plan:     Claritza Obando is a 27 year old  here for confirmation of pregnancy.    Claritza was seen today for prenatal care.    Diagnoses and all orders for this visit:    Pregnancy, unspecified gestational age:  at 9w3d by LMP 22 (within days). YOSI 23. Continue prenatal vitamin. Reviewed using unisom at bedtime to help with nausea. She had worsening of nausea with B6 last pregnancy. I offered zofran or reglan- she declines but will notify me via mychart if symptoms worsen. Plan for dating US to confirm dates.  -     US OB < 14 Weeks Single; Future  -     HCG qualitative urine; Future  -     HCG qualitative urine      Reviewed optional cfDNA testing- parents opt for this but will wait until after 10 weeks gestation.    Medications were reviewed for safety in pregnancy.  Risk factors identified today: history of  (36w4d- baby was LGA, unclear if maybe dating was inaccurate and she did not have  delivery). Reviewed option for progesterone (weekly jorje).    Return to clinic in about 2-4 weeks for Initial OB Visit.     Subjective:     Claritza Obando is a 27 year old female who presents for evaluation of amenorrhea.   Patient's last menstrual period was 2022 (within days).  Last period was normal  Current contraception: none  Current symptoms also include: nausea, +UPT at home, fatigue    Risk behaviors:    Tobacco use:  reports that she has never smoked. She has never used smokeless tobacco.  Drug or alcohol use: no      Current Outpatient Medications:      prenatal vit-iron fum-folic ac (PRENATAL VITAMIN) 27 mg iron- 0.8 mg Tab tablet, [PRENATAL VIT-IRON FUM-FOLIC AC (PRENATAL VITAMIN) 27 MG IRON- 0.8 MG TAB TABLET] Take 1 tablet by mouth daily., Disp: 100 tablet, Rfl: 3       Objective:     /60 (BP Location: Right arm, Patient Position: Sitting, Cuff Size: Adult Regular)   Pulse 84   Temp 98.4  F (36.9  C) (Oral)   Ht 1.556 m (5' 1.25\")   Wt 61.7 kg " (136 lb)   LMP 11/20/2022 (Within Days)   SpO2 98%   BMI 25.49 kg/m    Gen:  A&A, NAD.  FHT's: too early    Lab Review  Results for orders placed or performed in visit on 01/25/23   HCG qualitative urine   Result Value Ref Range    hCG Urine Qualitative Positive (A) Negative     Brenda Shaikh MD

## 2023-01-27 ENCOUNTER — HOSPITAL ENCOUNTER (OUTPATIENT)
Dept: ULTRASOUND IMAGING | Facility: CLINIC | Age: 28
Discharge: HOME | End: 2023-01-27
Attending: FAMILY MEDICINE | Admitting: FAMILY MEDICINE
Payer: COMMERCIAL

## 2023-01-27 DIAGNOSIS — Z34.90 PREGNANCY, UNSPECIFIED GESTATIONAL AGE: ICD-10-CM

## 2023-01-27 PROCEDURE — 76801 OB US < 14 WKS SINGLE FETUS: CPT

## 2023-02-03 ENCOUNTER — NURSE TRIAGE (OUTPATIENT)
Dept: NURSING | Facility: CLINIC | Age: 28
End: 2023-02-03
Payer: COMMERCIAL

## 2023-02-03 ENCOUNTER — MYC MEDICAL ADVICE (OUTPATIENT)
Dept: FAMILY MEDICINE | Facility: CLINIC | Age: 28
End: 2023-02-03
Payer: COMMERCIAL

## 2023-02-03 NOTE — TELEPHONE ENCOUNTER
Called patient. Her symptoms sound more consistent with musculoskeletal etiology. Recommended warm bath, tylenol. No vaginal bleeding or spotting. No fevers. Offered reassurance. No need to be seen at this time. Reviewed warning signs.

## 2023-02-03 NOTE — TELEPHONE ENCOUNTER
Writer attempt to call pt regarding MyChart message below. No answer, left non-detailed VM with call back number.    If pt calls back, needs to be triaged by an RN to determine if she needs to be seen based on reported symptoms. Thanks.    Eli Thompson, KIMBERLEEN, RN   [facility]

## 2023-02-03 NOTE — TELEPHONE ENCOUNTER
Pt refused triage as she is at work right now. Would like message sent to Dr. Shaikh    S-(situation): pt called back regarding lower back apin    B-(background): pt noticed back pain started this morning but is worried because she had a miscarriage in the past. Would like PCP's advise.    A-(assessment):   - noticed it this morning   - pain at a 4/10   - noticed it gets more painful when she moves  - gagging and throwing up on the morning.  - pt sounds emotional and anxious     R-(recommendations):   Pt would like advise from PCP.  - recommended call back if symptoms worsen.    Pt would like a call back instead of a mychart response. Okay to leave detailed message.    Gurmeet Gamble Cem Say, BSN RN  [facility]

## 2023-02-03 NOTE — TELEPHONE ENCOUNTER
9 weeks pregnant.  History of a miscarriage and this reminds her of that. Flank pain on either side is a stabbing pain that comes and goes. Today having bad lower back pain above buttocks. Sometimes has side pain on both sides. I connected with scheduling for an appointment and advised urgent care or ER if they can't get her in.  Mony Solano RN  [facility]        Reason for Disposition    Flank pain  (Exception: pain that is only present with movement)    Additional Information    Negative: Shock suspected (e.g., cold/pale/clammy skin, too weak to stand, low BP, rapid pulse)    Negative: SEVERE abdominal pain    Negative: Sounds like a life-threatening emergency to the triager    Negative: Major injury to the back (e.g., MVA, fall > 10 feet or 3 meters, penetrating injury, etc.)    Negative: Having contractions or other symptoms of labor (such as vaginal pressure) and pregnant 37 or more weeks (i.e., term pregnancy)    Negative: Having contractions or other symptoms of labor (such as vaginal pressure) and < 37 weeks pregnant (i.e., )    Negative: Abdominal pain and pregnant 20 or more weeks    Negative: Abdominal pain and pregnant < 20 weeks    Negative: Pain in the upper back and overlies the rib cage    Negative: Pain in the upper back and worsened by coughing (or clearly increases with breathing)    Negative: Unable to urinate (or only a few drops) > 4 hours and bladder feels very full (e.g., palpable bladder or strong urge to urinate)    Negative: Numbness in groin or rectal area (i.e., loss of sensation)    Negative: Leakage of fluid from vagina    Negative: Pregnant 23 or more weeks and baby is moving less today (e.g., kick count < 5 in 1 hour or < 10 in 2 hours)    Negative: Weakness of a leg or foot (e.g., unable to bear weight, dragging foot)    Negative: Unable to walk    Negative: Patient sounds very sick or weak to the triager    Negative: SEVERE back pain (e.g., excruciating,  unable to do any normal activities) and not improved 2 hours after pain medicine     Lower back at 4    Negative: Pain radiates into the thigh or further down the leg, and in both legs    Negative: Flank pain (i.e., in side, below ribs and above hip) and fever > 100.4 F  (38.0 C)     Stabbing pain.    Negative: Pain or burning with passing urine (urination)    Negative: Numbness in a leg or foot (i.e., loss of sensation)    Negative: High-risk adult (e.g., history of cancer, HIV, or IV Drug Use)    Protocols used: PREGNANCY - BACK PAIN-A-[place]

## 2023-02-16 LAB
ABO/RH(D): NORMAL
ANTIBODY SCREEN: NEGATIVE
SPECIMEN EXPIRATION DATE: NORMAL

## 2023-02-17 ENCOUNTER — PRENATAL OFFICE VISIT (OUTPATIENT)
Dept: FAMILY MEDICINE | Facility: CLINIC | Age: 28
End: 2023-02-17
Payer: COMMERCIAL

## 2023-02-17 VITALS
DIASTOLIC BLOOD PRESSURE: 60 MMHG | SYSTOLIC BLOOD PRESSURE: 104 MMHG | OXYGEN SATURATION: 99 % | HEIGHT: 62 IN | HEART RATE: 79 BPM | WEIGHT: 137.2 LBS | RESPIRATION RATE: 20 BRPM | BODY MASS INDEX: 25.25 KG/M2 | TEMPERATURE: 98.3 F

## 2023-02-17 DIAGNOSIS — Z34.90 PREGNANCY, UNSPECIFIED GESTATIONAL AGE: Primary | ICD-10-CM

## 2023-02-17 DIAGNOSIS — Z34.80 PRENATAL CARE, SUBSEQUENT PREGNANCY, UNSPECIFIED TRIMESTER: ICD-10-CM

## 2023-02-17 LAB
ERYTHROCYTE [DISTWIDTH] IN BLOOD BY AUTOMATED COUNT: 13.2 % (ref 10–15)
HCT VFR BLD AUTO: 37.1 % (ref 35–47)
HGB BLD-MCNC: 12.4 G/DL (ref 11.7–15.7)
MCH RBC QN AUTO: 27.4 PG (ref 26.5–33)
MCHC RBC AUTO-ENTMCNC: 33.4 G/DL (ref 31.5–36.5)
MCV RBC AUTO: 82 FL (ref 78–100)
PLATELET # BLD AUTO: 279 10E3/UL (ref 150–450)
RBC # BLD AUTO: 4.52 10E6/UL (ref 3.8–5.2)
WBC # BLD AUTO: 6.4 10E3/UL (ref 4–11)

## 2023-02-17 PROCEDURE — G0145 SCR C/V CYTO,THINLAYER,RESCR: HCPCS | Performed by: FAMILY MEDICINE

## 2023-02-17 PROCEDURE — 99213 OFFICE O/P EST LOW 20 MIN: CPT | Performed by: FAMILY MEDICINE

## 2023-02-17 PROCEDURE — 36415 COLL VENOUS BLD VENIPUNCTURE: CPT | Performed by: FAMILY MEDICINE

## 2023-02-17 PROCEDURE — 86850 RBC ANTIBODY SCREEN: CPT | Performed by: FAMILY MEDICINE

## 2023-02-17 PROCEDURE — 87591 N.GONORRHOEAE DNA AMP PROB: CPT | Performed by: FAMILY MEDICINE

## 2023-02-17 PROCEDURE — 86900 BLOOD TYPING SEROLOGIC ABO: CPT | Performed by: FAMILY MEDICINE

## 2023-02-17 PROCEDURE — 87340 HEPATITIS B SURFACE AG IA: CPT | Performed by: FAMILY MEDICINE

## 2023-02-17 PROCEDURE — 85027 COMPLETE CBC AUTOMATED: CPT | Performed by: FAMILY MEDICINE

## 2023-02-17 PROCEDURE — 83655 ASSAY OF LEAD: CPT | Mod: 90 | Performed by: FAMILY MEDICINE

## 2023-02-17 PROCEDURE — 87086 URINE CULTURE/COLONY COUNT: CPT | Performed by: FAMILY MEDICINE

## 2023-02-17 PROCEDURE — 86780 TREPONEMA PALLIDUM: CPT | Performed by: FAMILY MEDICINE

## 2023-02-17 PROCEDURE — 87491 CHLMYD TRACH DNA AMP PROBE: CPT | Performed by: FAMILY MEDICINE

## 2023-02-17 PROCEDURE — 86901 BLOOD TYPING SEROLOGIC RH(D): CPT | Performed by: FAMILY MEDICINE

## 2023-02-17 PROCEDURE — 86803 HEPATITIS C AB TEST: CPT | Performed by: FAMILY MEDICINE

## 2023-02-17 PROCEDURE — 86762 RUBELLA ANTIBODY: CPT | Performed by: FAMILY MEDICINE

## 2023-02-17 PROCEDURE — 99207 PR FIRST OB VISIT: CPT | Performed by: FAMILY MEDICINE

## 2023-02-17 PROCEDURE — 87389 HIV-1 AG W/HIV-1&-2 AB AG IA: CPT | Performed by: FAMILY MEDICINE

## 2023-02-17 PROCEDURE — 99000 SPECIMEN HANDLING OFFICE-LAB: CPT | Performed by: FAMILY MEDICINE

## 2023-02-17 NOTE — PROGRESS NOTES
New OB Clinic Note - 2023     SUBJECTIVE:  Claritza Obando is a 27 year old  female @ Unknown who is here for new OB visit.   LMP approximate 23. Ultrasound at 8w5d gave YOSI 9/3/23  Current Concerns:    She reports overall nausea is improving- still worse in the morning. Chewing gum helps  She denies bleeding, cramping.   OB History:  Patient is . Prior Pregnancies:  OB History    Para Term  AB Living   3 1 0 1 1 1   SAB IAB Ectopic Multiple Live Births   1 0 0 0 1      # Outcome Date GA Lbr Chucky/2nd Weight Sex Delivery Anes PTL Lv   3 Current            2  10/01/19 36w4d  3.379 kg (7 lb 7.2 oz) M Vag-Spont EPI Y CLAYTON      Name: Garham May   1 SAB 2018 8w0d            She DOES have a history of  birth before 37 weeks with a browning gestation.  She does not have a history of preeclampsia in prior pregnancy.   She does not have a history of recurrent (>2) pregnancy losses.  She does not have a history of Down's syndrome, sickle cell anemia or other genetic disorder affecting a child in her family.  She does not have a history of major depression or postpartum depression.  She does not have a history of STI's including Chlamydia, gonorrhea, Hep B virus, syphilis, HPV, or genital herpes.   Social Hx:   She has good support from her family and father of baby, Jitendra Portillo, is involved.   She is working.  She has not used tobacco, has not used EtOH and has not used illicit drugs.  Current Medications: prenatal vitamins    No past medical history on file.  Past Surgical History:   Procedure Laterality Date     [place] DENTAL SURGERY PROCEDURE      Description: Oral Surgery Tooth Extraction;  Recorded: 2012;  Comments: wisdom teeth removed 11     Family History   Problem Relation Age of Onset     Ovarian cysts Mother      Asthma Father      Diabetes Father      Substance Abuse Father      Cerebrovascular Disease Maternal Grandfather      Heart Disease Maternal  "Grandfather      Cancer Paternal Grandmother      Diabetes Paternal Grandmother      Cerebrovascular Disease Paternal Grandfather      Asthma Paternal Grandfather      Current Outpatient Medications   Medication     prenatal vit-iron fum-folic ac (PRENATAL VITAMIN) 27 mg iron- 0.8 mg Tab tablet     No current facility-administered medications for this visit.     ?  OBJECTIVE:  Vitals:    23 0700   BP: 104/60   BP Location: Left arm   Patient Position: Sitting   Cuff Size: Adult Regular   Pulse: 79   Resp: 20   Temp: 98.3  F (36.8  C)   TempSrc: Oral   SpO2: 99%   Weight: 62.2 kg (137 lb 3.2 oz)   Height: 1.57 m (5' 1.81\")     Gen: Awake, alert, in no acute distress  Abd: non-tender, non-distended.   Pelvic Exam: Vagina and vulva are normal; no discharge is noted. Cervix normal without lesions. She is due for a pap smear today.  Lower extremities: No edema  Neuro: No focal deficits  's  Results for orders placed or performed in visit on 23   CBC with platelets     Status: Normal   Result Value Ref Range    WBC Count 6.4 4.0 - 11.0 10e3/uL    RBC Count 4.52 3.80 - 5.20 10e6/uL    Hemoglobin 12.4 11.7 - 15.7 g/dL    Hematocrit 37.1 35.0 - 47.0 %    MCV 82 78 - 100 fL    MCH 27.4 26.5 - 33.0 pg    MCHC 33.4 31.5 - 36.5 g/dL    RDW 13.2 10.0 - 15.0 %    Platelet Count 279 150 - 450 10e3/uL   ABO/Rh type and screen     Status: None (In process)    Narrative    The following orders were created for panel order ABO/Rh type and screen.  Procedure                               Abnormality         Status                     ---------                               -----------         ------                     Adult Type and Screen[[id number]]                            In process                   Please view results for these tests on the individual orders.     ASSESSMENT/PLAN:  lCaritza Obando is a 27 year old  at 11w5d by 8 week US. Estimated Date of Delivery: Sep 3, 2023.   1. Started prenatal " vitamins.   2. Pelvic exam including GC, Chlamydia and PAP (if >21yrs) was completed.  3. Prenatal labs completed - prenatal profile, UA/UC, HIV   4. Reviewed eligibility for low-dose aspirin therapy for prevention of preeclampsia (preeclampsia in prior pregnancy, pre-existing HTN or DM, or multiple other risk factors including  delivery, chronic HTN, DM, obesity, low socioeconomic status, non- ethnicity). Patient is not a candidate for this.   5. Reviewed eligibility for 17-hydroxyprogesterone therapy for prevention of  birth (prior browning delivery before 37 weeks). Patient is a candidate for this- based on late  infant but due to possible discrepancy with dates due to LGA infant- patient declines progesterone supplementation and I am in agreement with this plan  6. Offered NIPT- ordered today. Reviewed how to view results on [email or web address].  7. Counseled on benefits of breastfeeding. Patient is planning to breastfeed.      Claritza was seen today for prenatal care.    Diagnoses and all orders for this visit:    Pregnancy, unspecified gestational age  -     Non Invasive Prenatal Test Cell Free DNA; Future  -     Non Invasive Prenatal Test Cell Free DNA  -     Neisseria gonorrhoeae PCR - Clinic Collect  -     Chlamydia trachomatis PCR - Clinic Collect    Prenatal care, subsequent pregnancy, unspecified trimester  -     ABO/Rh type and screen; Future  -     Hepatitis B surface antigen; Future  -     CBC with platelets; Future  -     HIV Antigen Antibody Combo; Future  -     Rubella Antibody IgG; Future  -     Treponema Abs w Reflex to RPR and Titer; Future  -     Urine Culture Aerobic Bacterial; Future  -     Lead, Venous Blood; Future  -     Hepatitis C antibody; Future  -     Pap screen reflex to HPV if ASCUS - recommend age 25 - 29  -     ABO/Rh type and screen  -     Hepatitis B surface antigen  -     CBC with platelets  -     HIV Antigen Antibody Combo  -     Rubella Antibody IgG  -      Treponema Abs w Reflex to RPR and Titer  -     Lead, Venous Blood  -     Hepatitis C antibody  -     Urine Culture Aerobic Bacterial      RTC in 4 weeks or sooner if problems. At next visit: offer MSAFP, order anatomy scan around 20 weeks gestation    Brenda Shaikh MD    Options for treatment and follow-up care were reviewed with the patient and/or guardian. Claritza Obando and/or guardian engaged in the decision making process and verbalized understanding of the options discussed and agreed with the final plan.

## 2023-02-18 LAB
C TRACH DNA SPEC QL NAA+PROBE: NEGATIVE
HBV SURFACE AG SERPL QL IA: NONREACTIVE
HCV AB SERPL QL IA: NONREACTIVE
HIV 1+2 AB+HIV1 P24 AG SERPL QL IA: NONREACTIVE
LEAD BLDV-MCNC: <2 UG/DL
N GONORRHOEA DNA SPEC QL NAA+PROBE: NEGATIVE

## 2023-02-19 LAB — BACTERIA UR CULT: NORMAL

## 2023-02-20 LAB
RUBV IGG SERPL QL IA: 6.47 INDEX
RUBV IGG SERPL QL IA: POSITIVE
T PALLIDUM AB SER QL: NONREACTIVE

## 2023-02-22 LAB
BKR LAB AP GYN ADEQUACY: NORMAL
BKR LAB AP GYN INTERPRETATION: NORMAL
BKR LAB AP HPV REFLEX: NORMAL
BKR LAB AP PREVIOUS ABNORMAL: NORMAL
PATH REPORT.COMMENTS IMP SPEC: NORMAL
PATH REPORT.COMMENTS IMP SPEC: NORMAL
PATH REPORT.RELEVANT HX SPEC: NORMAL
SCANNED LAB RESULT: NORMAL

## 2023-03-22 ENCOUNTER — PRENATAL OFFICE VISIT (OUTPATIENT)
Dept: FAMILY MEDICINE | Facility: CLINIC | Age: 28
End: 2023-03-22
Payer: COMMERCIAL

## 2023-03-22 VITALS
BODY MASS INDEX: 26.02 KG/M2 | WEIGHT: 141.38 LBS | HEART RATE: 82 BPM | HEIGHT: 62 IN | SYSTOLIC BLOOD PRESSURE: 116 MMHG | TEMPERATURE: 98.2 F | RESPIRATION RATE: 18 BRPM | OXYGEN SATURATION: 99 % | DIASTOLIC BLOOD PRESSURE: 74 MMHG

## 2023-03-22 DIAGNOSIS — Z34.90 PREGNANCY, UNSPECIFIED GESTATIONAL AGE: Primary | ICD-10-CM

## 2023-03-22 DIAGNOSIS — N94.9 ROUND LIGAMENT PAIN: ICD-10-CM

## 2023-03-22 PROCEDURE — 99207 PR PRENATAL VISIT: CPT | Performed by: FAMILY MEDICINE

## 2023-03-22 NOTE — PROGRESS NOTES
"SUBJECTIVE:   at 16w3d by 8 week US. Estimated Date of Delivery: Sep 3, 2023.  She is doing well. She reports continued mild nausea in mornings- improved with eating. She denies abdominal pain/cramping, vaginal bleeding, leakage of fluid. Fetal movement- maybe a few flutters but not sure.   Concerns: having some lower back pain and sharp pains in right and left groin/pelvic region.  ROS  Negative except as noted above in HPI.  OBJECTIVE:  Blood pressure 116/74, pulse 82, temperature 98.2  F (36.8  C), temperature source Oral, resp. rate 18, height 1.57 m (5' 1.81\"), weight 64.1 kg (141 lb 6 oz), last menstrual period 2022, SpO2 99 %, unknown if currently breastfeeding.  Gen: comfortable, no acute distress.  See OB Vitals flowsheet.  ASSESSMENT/PLAN:  Claritza was seen today for prenatal care.    Diagnoses and all orders for this visit:    Pregnancy, unspecified gestational age  -     US OB > 14 Weeks; Future- gave patient # to schedule    Round ligament pain: Reviewed maternity support belt.    -IUP at 16w3d:     Prenatal labs reviewed     Reviewed MSAFP- declines. Low risk NIPT. It's a 2nd boy!    RTC in 4 weeks or sooner with problems.      Brenda Shaikh MD    "

## 2023-04-17 ENCOUNTER — HOSPITAL ENCOUNTER (OUTPATIENT)
Dept: ULTRASOUND IMAGING | Facility: CLINIC | Age: 28
Discharge: HOME | End: 2023-04-17
Attending: FAMILY MEDICINE | Admitting: FAMILY MEDICINE
Payer: COMMERCIAL

## 2023-04-17 DIAGNOSIS — Z34.90 PREGNANCY, UNSPECIFIED GESTATIONAL AGE: ICD-10-CM

## 2023-04-17 PROCEDURE — 76805 OB US >/= 14 WKS SNGL FETUS: CPT

## 2023-04-19 ENCOUNTER — PRENATAL OFFICE VISIT (OUTPATIENT)
Dept: FAMILY MEDICINE | Facility: CLINIC | Age: 28
End: 2023-04-19
Payer: COMMERCIAL

## 2023-04-19 VITALS
OXYGEN SATURATION: 99 % | HEIGHT: 62 IN | HEART RATE: 100 BPM | SYSTOLIC BLOOD PRESSURE: 122 MMHG | WEIGHT: 148.6 LBS | RESPIRATION RATE: 20 BRPM | TEMPERATURE: 98.3 F | BODY MASS INDEX: 27.34 KG/M2 | DIASTOLIC BLOOD PRESSURE: 73 MMHG

## 2023-04-19 DIAGNOSIS — Z34.90 PREGNANCY, UNSPECIFIED GESTATIONAL AGE: Primary | ICD-10-CM

## 2023-04-19 PROCEDURE — 99207 PR PRENATAL VISIT: CPT | Performed by: FAMILY MEDICINE

## 2023-04-19 NOTE — PROGRESS NOTES
"SUBJECTIVE:   at 20w3d. Estimated Date of Delivery: Sep 3, 2023.  She is doing well. She denies nausea and vomiting. She denies abdominal pain/cramping, vaginal bleeding, leakage of fluid. Fetal movement is present.   Concerns: feeling better- getting over cold. Still with residual cough but improved.   ROS  Negative except as noted above in HPI.  OBJECTIVE:  Blood pressure 122/73, pulse 100, temperature 98.3  F (36.8  C), temperature source Oral, resp. rate 20, height 1.57 m (5' 1.81\"), weight 67.4 kg (148 lb 9.6 oz), last menstrual period 2022, SpO2 99 %, unknown if currently breastfeeding.  Gen: comfortable, no acute distress.  See OB Vitals flowsheet.  ASSESSMENT/PLAN:  Claritza was seen today for prenatal care.    Diagnoses and all orders for this visit:    Pregnancy, unspecified gestational age    Other orders  -     PRIMARY CARE FOLLOW-UP SCHEDULING; Future      -IUP at 20w3d:     Prenatal labs reviewed     RTC in 4 weeks or sooner with problems. 1 hour GTT next appointment      Brenda Shaikh MD    "

## 2023-05-17 ENCOUNTER — OFFICE VISIT (OUTPATIENT)
Dept: FAMILY MEDICINE | Facility: CLINIC | Age: 28
End: 2023-05-17
Attending: FAMILY MEDICINE
Payer: COMMERCIAL

## 2023-05-17 VITALS
DIASTOLIC BLOOD PRESSURE: 80 MMHG | HEIGHT: 62 IN | RESPIRATION RATE: 20 BRPM | BODY MASS INDEX: 28.36 KG/M2 | WEIGHT: 154.13 LBS | TEMPERATURE: 97.8 F | SYSTOLIC BLOOD PRESSURE: 123 MMHG | OXYGEN SATURATION: 100 % | HEART RATE: 87 BPM

## 2023-05-17 DIAGNOSIS — Z34.90 PREGNANCY, UNSPECIFIED GESTATIONAL AGE: Primary | ICD-10-CM

## 2023-05-17 LAB
GLUCOSE 1H P 50 G GLC PO SERPL-MCNC: 102 MG/DL (ref 70–129)
HGB BLD-MCNC: 11.5 G/DL (ref 11.7–15.7)

## 2023-05-17 PROCEDURE — 86780 TREPONEMA PALLIDUM: CPT | Performed by: FAMILY MEDICINE

## 2023-05-17 PROCEDURE — 82950 GLUCOSE TEST: CPT | Performed by: FAMILY MEDICINE

## 2023-05-17 PROCEDURE — 99207 PR PRENATAL VISIT: CPT | Performed by: FAMILY MEDICINE

## 2023-05-17 PROCEDURE — 36415 COLL VENOUS BLD VENIPUNCTURE: CPT | Performed by: FAMILY MEDICINE

## 2023-05-17 PROCEDURE — 85018 HEMOGLOBIN: CPT | Performed by: FAMILY MEDICINE

## 2023-05-17 NOTE — PROGRESS NOTES
"SUBJECTIVE:   at 24w3d. Estimated Date of Delivery: Sep 3, 2023.  She is doing well. She denies nausea and vomiting. She denies abdominal pain/cramping, vaginal bleeding, leakage of fluid. Fetal movement is present.   Concerns: more fatigued. She would like breast pump prescription- trying to get wearable breast pump through her insuranceo.  ROS  Negative except as noted above in HPI.  OBJECTIVE:  Blood pressure 123/80, pulse 87, temperature 97.8  F (36.6  C), temperature source Oral, resp. rate 20, height 1.57 m (5' 1.81\"), weight 69.9 kg (154 lb 2 oz), last menstrual period 2022, SpO2 100 %, unknown if currently breastfeeding.   Wt Readings from Last 3 Encounters:   23 69.9 kg (154 lb 2 oz)   23 67.4 kg (148 lb 9.6 oz)   23 64.1 kg (141 lb 6 oz)     Gen: comfortable, no acute distress.  See OB Vitals flowsheet.  ASSESSMENT/PLAN:  Claritza was seen today for prenatal care.    Diagnoses and all orders for this visit:    Pregnancy, unspecified gestational age  -     Glucose tolerance, gest screen, 1 hour; Future  -     Treponema Abs w Reflex to RPR and Titer; Future  -     Hemoglobin; Future  -     Breast Pump Order for DME - ONLY FOR DME  -     Glucose tolerance, gest screen, 1 hour  -     Treponema Abs w Reflex to RPR and Titer  -     Hemoglobin    Other orders  -     PRIMARY CARE FOLLOW-UP SCHEDULING      -IUP at 24w3d:     Prenatal labs reviewed     RTC in 4 weeks or sooner with problems. Tdap next visit        Brenda Shaikh MD    "

## 2023-05-18 LAB — T PALLIDUM AB SER QL: NONREACTIVE

## 2023-06-16 ENCOUNTER — PRENATAL OFFICE VISIT (OUTPATIENT)
Dept: FAMILY MEDICINE | Facility: CLINIC | Age: 28
End: 2023-06-16
Payer: COMMERCIAL

## 2023-06-16 VITALS
TEMPERATURE: 97.9 F | OXYGEN SATURATION: 99 % | SYSTOLIC BLOOD PRESSURE: 120 MMHG | HEART RATE: 92 BPM | RESPIRATION RATE: 20 BRPM | WEIGHT: 159 LBS | BODY MASS INDEX: 29.26 KG/M2 | HEIGHT: 62 IN | DIASTOLIC BLOOD PRESSURE: 60 MMHG

## 2023-06-16 DIAGNOSIS — Z34.90 PREGNANCY, UNSPECIFIED GESTATIONAL AGE: Primary | ICD-10-CM

## 2023-06-16 PROCEDURE — 90715 TDAP VACCINE 7 YRS/> IM: CPT | Performed by: FAMILY MEDICINE

## 2023-06-16 PROCEDURE — 90471 IMMUNIZATION ADMIN: CPT | Performed by: FAMILY MEDICINE

## 2023-06-16 PROCEDURE — 99207 PR PRENATAL VISIT: CPT | Performed by: FAMILY MEDICINE

## 2023-06-16 NOTE — PROGRESS NOTES
"SUBJECTIVE:   at 28w5d. Estimated Date of Delivery: Sep 3, 2023.  She is doing well. She denies vaginal bleeding, leakage of fluid, or urinary symptoms. Fetal movement is present. She is not having contractions.  Concerns: no concerns- her son had T&A and recovering at home with her  ROS  Negative except as noted above in HPI  OBJECTIVE:  Blood pressure 120/60, pulse 92, temperature 97.9  F (36.6  C), temperature source Oral, resp. rate 20, height 1.57 m (5' 1.81\"), weight 72.1 kg (159 lb), last menstrual period 2022, SpO2 99 %, unknown if currently breastfeeding.  Gen: Comfortable, no acute distress.  Abd: Gravid, non-tender  See OB Vitals flowsheet.  ASSESSMENT/PLAN:  Claritza was seen today for prenatal care.    Diagnoses and all orders for this visit:    Pregnancy, unspecified gestational age    Other orders  -     TDAP 10-64Y (ADACEL,BOOSTRIX)      -IUP at 28w5d:     Prenatal labs reviewed     RTC in 2 weeks or sooner with problems. Bedside US next visit      Brenda Shaikh MD    "

## 2023-06-28 ENCOUNTER — PRENATAL OFFICE VISIT (OUTPATIENT)
Dept: FAMILY MEDICINE | Facility: CLINIC | Age: 28
End: 2023-06-28
Payer: COMMERCIAL

## 2023-06-28 VITALS
WEIGHT: 161.25 LBS | RESPIRATION RATE: 16 BRPM | BODY MASS INDEX: 29.67 KG/M2 | HEIGHT: 62 IN | HEART RATE: 81 BPM | OXYGEN SATURATION: 97 % | SYSTOLIC BLOOD PRESSURE: 111 MMHG | DIASTOLIC BLOOD PRESSURE: 69 MMHG | TEMPERATURE: 98 F

## 2023-06-28 DIAGNOSIS — Z34.83 ENCOUNTER FOR SUPERVISION OF OTHER NORMAL PREGNANCY IN THIRD TRIMESTER: Primary | ICD-10-CM

## 2023-06-28 PROCEDURE — 99207 PR PRENATAL VISIT: CPT | Performed by: FAMILY MEDICINE

## 2023-06-28 NOTE — PROGRESS NOTES
"Assessment/Plan:   27 year old  at 30w3d    Encounter for supervision of other normal pregnancy in third trimester  Doing well.  Discussed  Labor warning s/sx    Return in 3 weeks (on 2023).  (out of town in 2 weeks)  Subjective:   Claritza Obando is a 27 year old  at 30w3d who is seen for routine prenatal care.   Doing well.  Endorses normal fetal movement.  No probs, no contractions  Objective   Vitals: /69   Pulse 81   Temp 98  F (36.7  C) (Oral)   Resp 16   Ht 1.57 m (5' 1.81\")   Wt 73.1 kg (161 lb 4 oz)   LMP 2022 (Within Days)   SpO2 97%   Breastfeeding No   BMI 29.67 kg/m     Total weight gain:  11.9 kg (26 lb 4 oz)   Exam: Per flowsheet  Cephalic presentation confirmed on hand-held ultrasound     "

## 2023-07-19 ENCOUNTER — PRENATAL OFFICE VISIT (OUTPATIENT)
Dept: FAMILY MEDICINE | Facility: CLINIC | Age: 28
End: 2023-07-19
Payer: COMMERCIAL

## 2023-07-19 VITALS
HEIGHT: 62 IN | WEIGHT: 167.4 LBS | OXYGEN SATURATION: 98 % | HEART RATE: 92 BPM | DIASTOLIC BLOOD PRESSURE: 70 MMHG | BODY MASS INDEX: 30.8 KG/M2 | SYSTOLIC BLOOD PRESSURE: 124 MMHG | TEMPERATURE: 98 F | RESPIRATION RATE: 16 BRPM

## 2023-07-19 DIAGNOSIS — Z34.90 PREGNANCY, UNSPECIFIED GESTATIONAL AGE: Primary | ICD-10-CM

## 2023-07-19 DIAGNOSIS — S39.013A: ICD-10-CM

## 2023-07-19 PROCEDURE — 99207 PR PRENATAL VISIT: CPT | Performed by: FAMILY MEDICINE

## 2023-07-19 NOTE — PROGRESS NOTES
"SUBJECTIVE:   at 33w3d. Estimated Date of Delivery: Sep 3, 2023.  She is doing well. She denies vaginal bleeding, leakage of fluid, or urinary symptoms. Fetal movement is present. She is not having any painful or regular contractions.  Concerns: having some left breast pain. Has tried warm shower. No redness/warmth or fevers. She also is having more pelvic pressure, pain.   ROS  Negative except as noted above in HPI  OBJECTIVE:  Blood pressure 124/70, pulse 92, temperature 98  F (36.7  C), temperature source Oral, resp. rate 16, height 1.57 m (5' 1.81\"), weight 75.9 kg (167 lb 6.4 oz), last menstrual period 2022, SpO2 98 %, not currently breastfeeding.  Gen: Comfortable, no acute distress.  Abd: Gravid, non-tender  See OB Vitals flowsheet  ASSESSMENT/PLAN:  Claritza was seen today for prenatal care.    Diagnoses and all orders for this visit:    Pregnancy, unspecified gestational age    Strain of fascia of pelvis: Reviewed using pelvic support belt.   -     OB/Maternity Back Brace for DME - ONLY FOR DME      -IUP at 33w3d:     Prenatal labs reviewed     RTC in 2 weeks or sooner with problems. GBS next visit       Brenda Shaikh MD    "

## 2023-08-04 ENCOUNTER — PRENATAL OFFICE VISIT (OUTPATIENT)
Dept: FAMILY MEDICINE | Facility: CLINIC | Age: 28
End: 2023-08-04
Payer: COMMERCIAL

## 2023-08-04 VITALS
SYSTOLIC BLOOD PRESSURE: 126 MMHG | HEIGHT: 62 IN | RESPIRATION RATE: 16 BRPM | OXYGEN SATURATION: 99 % | DIASTOLIC BLOOD PRESSURE: 77 MMHG | HEART RATE: 85 BPM | BODY MASS INDEX: 31.3 KG/M2 | WEIGHT: 170.06 LBS | TEMPERATURE: 98 F

## 2023-08-04 DIAGNOSIS — O26.843 UTERINE SIZE-DATE DISCREPANCY IN THIRD TRIMESTER: ICD-10-CM

## 2023-08-04 DIAGNOSIS — Z34.90 PREGNANCY, UNSPECIFIED GESTATIONAL AGE: Primary | ICD-10-CM

## 2023-08-04 PROCEDURE — 87653 STREP B DNA AMP PROBE: CPT | Performed by: FAMILY MEDICINE

## 2023-08-04 PROCEDURE — 59426 ANTEPARTUM CARE ONLY: CPT | Performed by: FAMILY MEDICINE

## 2023-08-04 NOTE — PROGRESS NOTES
"SUBJECTIVE:   at 35w5d. Estimated Date of Delivery: Sep 3, 2023.  She is doing well. She denies vaginal bleeding, leakage of fluid, or urinary symptoms. Fetal movement is present. She is not having contractions.  Concerns: feeling more uncomfortable, difficulty to move at night. More sharp pelvic pain, pressure.   ROS  Negative except as noted above in HPI  OBJECTIVE:  Blood pressure 126/77, pulse 85, temperature 98  F (36.7  C), temperature source Oral, resp. rate 16, height 1.57 m (5' 1.81\"), weight 77.1 kg (170 lb 1 oz), last menstrual period 2022, SpO2 99 %, not currently breastfeeding.  Gen: Comfortable, no acute distress.  Abd: Gravid, non-tender  See OB Vitals flowsheet.  ASSESSMENT/PLAN:  Claritza was seen today for prenatal care.    Diagnoses and all orders for this visit:    Pregnancy, unspecified gestational age  -     Group B strep PCR    Uterine size-date discrepancy in third trimester: History of LGA infant at 36 weeks. Size>Dates today, will obtain growth US  -     US OB > 14 Weeks; Future      -IUP at 35w5d:   Prenatal labs reviewed   Has L&D # to call  Reviewed on-call coverage          Brenda Shaikh MD    "

## 2023-08-05 LAB — GP B STREP DNA SPEC QL NAA+PROBE: NEGATIVE

## 2023-08-06 ENCOUNTER — HEALTH MAINTENANCE LETTER (OUTPATIENT)
Age: 28
End: 2023-08-06

## 2023-09-18 ENCOUNTER — PRENATAL OFFICE VISIT (OUTPATIENT)
Dept: FAMILY MEDICINE | Facility: CLINIC | Age: 28
End: 2023-09-18
Payer: COMMERCIAL

## 2023-09-18 VITALS
SYSTOLIC BLOOD PRESSURE: 115 MMHG | HEART RATE: 69 BPM | BODY MASS INDEX: 27.9 KG/M2 | WEIGHT: 147.75 LBS | HEIGHT: 61 IN | DIASTOLIC BLOOD PRESSURE: 68 MMHG | OXYGEN SATURATION: 99 % | RESPIRATION RATE: 18 BRPM | TEMPERATURE: 97.5 F

## 2023-09-18 PROBLEM — Z34.90 PREGNANCY, UNSPECIFIED GESTATIONAL AGE: Status: RESOLVED | Noted: 2023-02-17 | Resolved: 2023-09-18

## 2023-09-18 NOTE — PROGRESS NOTES
Assessment/Plan:     Claritza was seen today for postpartum visit .    Diagnoses and all orders for this visit:    Routine postpartum follow-up      Reviewed option for pelvic PT, reviewed pelvic strengthening exercises- she declines referral at this time. Plans to do at-home exercises and monitor symptoms.    Anemia:  Normal antepartum hemoglobin with no excessive blood loss  Breastfeeding/Bottle feeding:  Pt is breasfeeding, discussed how to support. Mostly pumping and feeding expressed breast milk- supply is good- 4 ounces each pumping session.  Contraception:   Natural family planning, withdrawal method- declines formal contraception  Depression:   Denies concerns  Exercise:   Encouraged increasing exercise based on how she is feeling.  Healthcare maintenance:   Up to date  Immunizations:    Due flu shot, declines             Subjective:      Claritza Obando is a 27 year old female who presents for a postpartum visit.   She is 6 weeks postpartum following a spontaneous vaginal delivery.   I have fully reviewed the prenatal and intrapartum course.   Delivery notes below  Postpartum course has been Unremarkable.  Baby's course has been Uncomplicated.  Periods: none Patient's last menstrual period was 2022 (within days).   Bowel or bladder concerns: some urine incontinence with sneezing  Patient has not resumed intercourse.      Last hgb on file:    Lab Results   Component Value Date    HGB 11.5 (L) 2023        The following portions of the patient's history were reviewed and updated as appropriate: allergies, current medications and problem list     OB History    Para Term  AB Living   3 1 0 1 1 1   SAB IAB Ectopic Multiple Live Births   1 0 0 0 1      # Outcome Date GA Lbr Chucky/2nd Weight Sex Delivery Anes PTL Lv   3             2  10/01/19 36w4d  3.379 kg (7 lb 7.2 oz) M Vag-Spont EPI Y CLAYTON      Name: Graham May   1 SAB 2018 8w0d            This patient has no babies on  "file.    Objective:      /68 (BP Location: Left arm, Patient Position: Sitting, Cuff Size: Adult Regular)   Pulse 69   Temp 97.5  F (36.4  C) (Temporal)   Resp 18   Ht 1.56 m (5' 1.42\")   Wt 67 kg (147 lb 12 oz)   LMP 11/20/2022 (Within Days)   SpO2 99%   Breastfeeding Yes   BMI 27.54 kg/m    Wt Readings from Last 3 Encounters:   09/18/23 67 kg (147 lb 12 oz)   08/04/23 77.1 kg (170 lb 1 oz)   07/19/23 75.9 kg (167 lb 6.4 oz)       Physical Exam:  General Appearance: Alert, cooperative, no distress, appears stated age  Abdomen: Soft, non-tender, no masses, no organomegaly  Pelvic: Well healed perineum, no prolapse noted      Brenda Shaikh MD       "

## 2023-12-18 ENCOUNTER — MEDICAL CORRESPONDENCE (OUTPATIENT)
Dept: HEALTH INFORMATION MANAGEMENT | Facility: CLINIC | Age: 28
End: 2023-12-18
Payer: COMMERCIAL

## 2024-02-07 ENCOUNTER — MEDICAL CORRESPONDENCE (OUTPATIENT)
Dept: HEALTH INFORMATION MANAGEMENT | Facility: CLINIC | Age: 29
End: 2024-02-07
Payer: COMMERCIAL

## 2024-09-28 ENCOUNTER — HEALTH MAINTENANCE LETTER (OUTPATIENT)
Age: 29
End: 2024-09-28

## 2024-12-09 ENCOUNTER — MYC MEDICAL ADVICE (OUTPATIENT)
Dept: FAMILY MEDICINE | Facility: CLINIC | Age: 29
End: 2024-12-09
Payer: COMMERCIAL

## 2024-12-09 NOTE — TELEPHONE ENCOUNTER
Responded to patient via MyChart coached on starting an E-visit with a provider.      Conner Braxton RN  [facility]